# Patient Record
Sex: MALE | Race: WHITE | NOT HISPANIC OR LATINO | Employment: FULL TIME | URBAN - METROPOLITAN AREA
[De-identification: names, ages, dates, MRNs, and addresses within clinical notes are randomized per-mention and may not be internally consistent; named-entity substitution may affect disease eponyms.]

---

## 2023-10-06 DIAGNOSIS — I10 ESSENTIAL (PRIMARY) HYPERTENSION: ICD-10-CM

## 2023-10-06 RX ORDER — LISINOPRIL 5 MG/1
5 TABLET ORAL DAILY
Qty: 90 TABLET | Refills: 1 | Status: SHIPPED | OUTPATIENT
Start: 2023-10-06 | End: 2024-04-19

## 2023-12-14 ENCOUNTER — APPOINTMENT (OUTPATIENT)
Dept: PRIMARY CARE | Facility: CLINIC | Age: 68
End: 2023-12-14
Payer: MEDICARE

## 2023-12-15 PROBLEM — L82.1 OTHER SEBORRHEIC KERATOSIS: Status: ACTIVE | Noted: 2022-05-31

## 2023-12-15 PROBLEM — R06.89 DYSPNEA AND RESPIRATORY ABNORMALITIES: Status: ACTIVE | Noted: 2023-12-15

## 2023-12-15 PROBLEM — L57.8 OTHER SKIN CHANGES DUE TO CHRONIC EXPOSURE TO NONIONIZING RADIATION: Status: ACTIVE | Noted: 2022-05-31

## 2023-12-15 PROBLEM — I20.89 OTHER FORMS OF ANGINA PECTORIS (CMS-HCC): Status: ACTIVE | Noted: 2023-12-15

## 2023-12-15 PROBLEM — B02.29 POSTHERPETIC NEURALGIA: Status: ACTIVE | Noted: 2023-12-15

## 2023-12-15 PROBLEM — D18.01 HEMANGIOMA OF SKIN AND SUBCUTANEOUS TISSUE: Status: ACTIVE | Noted: 2022-05-31

## 2023-12-15 PROBLEM — K42.9 UMBILICAL HERNIA: Status: ACTIVE | Noted: 2023-12-15

## 2023-12-15 PROBLEM — E78.5 HYPERLIPIDEMIA: Status: ACTIVE | Noted: 2023-12-15

## 2023-12-15 PROBLEM — R93.1 ELEVATED CORONARY ARTERY CALCIUM SCORE: Status: ACTIVE | Noted: 2023-12-15

## 2023-12-15 PROBLEM — I10 ESSENTIAL HYPERTENSION, BENIGN: Status: ACTIVE | Noted: 2023-12-15

## 2023-12-15 PROBLEM — R73.01 ELEVATED FASTING GLUCOSE: Status: ACTIVE | Noted: 2023-12-15

## 2023-12-15 PROBLEM — R94.39 ABNORMAL STRESS TEST: Status: ACTIVE | Noted: 2023-12-15

## 2023-12-15 PROBLEM — J98.4 CALCIFIED GRANULOMA OF LUNG: Status: ACTIVE | Noted: 2023-12-15

## 2023-12-15 PROBLEM — J84.10 CALCIFIED GRANULOMA OF LUNG (MULTI): Status: ACTIVE | Noted: 2023-12-15

## 2023-12-15 PROBLEM — J62.8: Status: ACTIVE | Noted: 2023-12-15

## 2023-12-15 PROBLEM — M67.471 DIGITAL MUCINOUS CYST OF TOE OF RIGHT FOOT: Status: ACTIVE | Noted: 2023-12-15

## 2023-12-15 PROBLEM — L81.9 PIGMENTED SKIN LESION: Status: ACTIVE | Noted: 2023-12-15

## 2023-12-15 PROBLEM — R06.00 DYSPNEA AND RESPIRATORY ABNORMALITIES: Status: ACTIVE | Noted: 2023-12-15

## 2023-12-15 PROBLEM — M79.675 PAIN OF LEFT GREAT TOE: Status: ACTIVE | Noted: 2023-12-15

## 2023-12-15 PROBLEM — L57.0 ACTINIC KERATOSIS: Status: ACTIVE | Noted: 2022-05-31

## 2023-12-15 RX ORDER — ROSUVASTATIN CALCIUM 20 MG/1
1 TABLET, COATED ORAL DAILY
COMMUNITY
End: 2024-02-14

## 2023-12-15 RX ORDER — LEVOTHYROXINE SODIUM 50 UG/1
1 TABLET ORAL
COMMUNITY
Start: 2023-08-11

## 2023-12-22 ENCOUNTER — LAB (OUTPATIENT)
Dept: LAB | Facility: LAB | Age: 68
End: 2023-12-22
Payer: MEDICARE

## 2023-12-22 DIAGNOSIS — E78.5 HYPERLIPIDEMIA, UNSPECIFIED: ICD-10-CM

## 2023-12-22 DIAGNOSIS — Z86.2 PERSONAL HISTORY OF DISEASES OF THE BLOOD AND BLOOD-FORMING ORGANS AND CERTAIN DISORDERS INVOLVING THE IMMUNE MECHANISM: ICD-10-CM

## 2023-12-22 DIAGNOSIS — R73.01 IMPAIRED FASTING GLUCOSE: Primary | ICD-10-CM

## 2023-12-22 LAB
ALBUMIN SERPL BCP-MCNC: 4.4 G/DL (ref 3.4–5)
ALP SERPL-CCNC: 80 U/L (ref 33–136)
ALT SERPL W P-5'-P-CCNC: 23 U/L (ref 10–52)
ANION GAP SERPL CALC-SCNC: 10 MMOL/L (ref 10–20)
AST SERPL W P-5'-P-CCNC: 16 U/L (ref 9–39)
BASOPHILS # BLD AUTO: 0.03 X10*3/UL (ref 0–0.1)
BASOPHILS NFR BLD AUTO: 0.5 %
BILIRUB SERPL-MCNC: 0.6 MG/DL (ref 0–1.2)
BUN SERPL-MCNC: 19 MG/DL (ref 6–23)
CALCIUM SERPL-MCNC: 9 MG/DL (ref 8.6–10.3)
CHLORIDE SERPL-SCNC: 103 MMOL/L (ref 98–107)
CHOLEST SERPL-MCNC: 137 MG/DL (ref 0–199)
CHOLESTEROL/HDL RATIO: 3
CO2 SERPL-SCNC: 29 MMOL/L (ref 21–32)
CREAT SERPL-MCNC: 0.76 MG/DL (ref 0.5–1.3)
EOSINOPHIL # BLD AUTO: 0.16 X10*3/UL (ref 0–0.7)
EOSINOPHIL NFR BLD AUTO: 2.9 %
ERYTHROCYTE [DISTWIDTH] IN BLOOD BY AUTOMATED COUNT: 12.5 % (ref 11.5–14.5)
EST. AVERAGE GLUCOSE BLD GHB EST-MCNC: 143 MG/DL
GFR SERPL CREATININE-BSD FRML MDRD: >90 ML/MIN/1.73M*2
GLUCOSE SERPL-MCNC: 155 MG/DL (ref 74–99)
HBA1C MFR BLD: 6.6 %
HCT VFR BLD AUTO: 45 % (ref 41–52)
HDLC SERPL-MCNC: 45 MG/DL
HGB BLD-MCNC: 15.1 G/DL (ref 13.5–17.5)
IMM GRANULOCYTES # BLD AUTO: 0.02 X10*3/UL (ref 0–0.7)
IMM GRANULOCYTES NFR BLD AUTO: 0.4 % (ref 0–0.9)
LDLC SERPL CALC-MCNC: 70 MG/DL
LYMPHOCYTES # BLD AUTO: 0.76 X10*3/UL (ref 1.2–4.8)
LYMPHOCYTES NFR BLD AUTO: 13.9 %
MCH RBC QN AUTO: 28.1 PG (ref 26–34)
MCHC RBC AUTO-ENTMCNC: 33.6 G/DL (ref 32–36)
MCV RBC AUTO: 84 FL (ref 80–100)
MONOCYTES # BLD AUTO: 0.77 X10*3/UL (ref 0.1–1)
MONOCYTES NFR BLD AUTO: 14.1 %
NEUTROPHILS # BLD AUTO: 3.72 X10*3/UL (ref 1.2–7.7)
NEUTROPHILS NFR BLD AUTO: 68.2 %
NON HDL CHOLESTEROL: 92 MG/DL (ref 0–149)
NRBC BLD-RTO: 0 /100 WBCS (ref 0–0)
PLATELET # BLD AUTO: 138 X10*3/UL (ref 150–450)
POTASSIUM SERPL-SCNC: 4.2 MMOL/L (ref 3.5–5.3)
PROT SERPL-MCNC: 6.8 G/DL (ref 6.4–8.2)
RBC # BLD AUTO: 5.38 X10*6/UL (ref 4.5–5.9)
SODIUM SERPL-SCNC: 138 MMOL/L (ref 136–145)
TRIGL SERPL-MCNC: 109 MG/DL (ref 0–149)
TSH SERPL-ACNC: 3.26 MIU/L (ref 0.44–3.98)
VLDL: 22 MG/DL (ref 0–40)
WBC # BLD AUTO: 5.5 X10*3/UL (ref 4.4–11.3)

## 2023-12-22 PROCEDURE — 80061 LIPID PANEL: CPT

## 2023-12-22 PROCEDURE — 83036 HEMOGLOBIN GLYCOSYLATED A1C: CPT

## 2023-12-22 PROCEDURE — 36415 COLL VENOUS BLD VENIPUNCTURE: CPT

## 2023-12-22 PROCEDURE — 84443 ASSAY THYROID STIM HORMONE: CPT

## 2023-12-22 PROCEDURE — 80053 COMPREHEN METABOLIC PANEL: CPT

## 2023-12-22 PROCEDURE — 85025 COMPLETE CBC W/AUTO DIFF WBC: CPT

## 2023-12-26 ENCOUNTER — TELEPHONE (OUTPATIENT)
Dept: PRIMARY CARE | Facility: CLINIC | Age: 68
End: 2023-12-26
Payer: MEDICARE

## 2023-12-26 NOTE — TELEPHONE ENCOUNTER
----- Message from Avery Ozuna MD sent at 12/26/2023  2:12 PM EST -----  Labs acceptable but there are a few abnormalities similar to previous labs.  Please advise patient to continue the same medications and follow-up to review in detail and discuss the management options.

## 2024-01-02 ENCOUNTER — OFFICE VISIT (OUTPATIENT)
Dept: PRIMARY CARE | Facility: CLINIC | Age: 69
End: 2024-01-02
Payer: MEDICARE

## 2024-01-02 VITALS
WEIGHT: 208 LBS | DIASTOLIC BLOOD PRESSURE: 78 MMHG | SYSTOLIC BLOOD PRESSURE: 166 MMHG | BODY MASS INDEX: 31.52 KG/M2 | HEIGHT: 68 IN | HEART RATE: 69 BPM

## 2024-01-02 DIAGNOSIS — D69.6 THROMBOCYTOPENIA, UNSPECIFIED (CMS-HCC): ICD-10-CM

## 2024-01-02 DIAGNOSIS — K42.9 UMBILICAL HERNIA WITHOUT OBSTRUCTION AND WITHOUT GANGRENE: ICD-10-CM

## 2024-01-02 DIAGNOSIS — J62.8: ICD-10-CM

## 2024-01-02 DIAGNOSIS — E11.9 DIABETES MELLITUS WITHOUT COMPLICATION (MULTI): Primary | ICD-10-CM

## 2024-01-02 DIAGNOSIS — I10 ESSENTIAL HYPERTENSION, BENIGN: ICD-10-CM

## 2024-01-02 DIAGNOSIS — R93.1 ELEVATED CORONARY ARTERY CALCIUM SCORE: ICD-10-CM

## 2024-01-02 DIAGNOSIS — E78.2 MIXED HYPERLIPIDEMIA: ICD-10-CM

## 2024-01-02 DIAGNOSIS — E66.9 OBESITY (BMI 30.0-34.9): ICD-10-CM

## 2024-01-02 DIAGNOSIS — Z12.5 PROSTATE CANCER SCREENING: ICD-10-CM

## 2024-01-02 PROCEDURE — 1159F MED LIST DOCD IN RCRD: CPT | Performed by: INTERNAL MEDICINE

## 2024-01-02 PROCEDURE — 4010F ACE/ARB THERAPY RXD/TAKEN: CPT | Performed by: INTERNAL MEDICINE

## 2024-01-02 PROCEDURE — 90662 IIV NO PRSV INCREASED AG IM: CPT | Performed by: INTERNAL MEDICINE

## 2024-01-02 PROCEDURE — 3077F SYST BP >= 140 MM HG: CPT | Performed by: INTERNAL MEDICINE

## 2024-01-02 PROCEDURE — 3008F BODY MASS INDEX DOCD: CPT | Performed by: INTERNAL MEDICINE

## 2024-01-02 PROCEDURE — 3078F DIAST BP <80 MM HG: CPT | Performed by: INTERNAL MEDICINE

## 2024-01-02 PROCEDURE — 99213 OFFICE O/P EST LOW 20 MIN: CPT | Performed by: INTERNAL MEDICINE

## 2024-01-02 PROCEDURE — G0008 ADMIN INFLUENZA VIRUS VAC: HCPCS | Performed by: INTERNAL MEDICINE

## 2024-01-02 PROCEDURE — 4004F PT TOBACCO SCREEN RCVD TLK: CPT | Performed by: INTERNAL MEDICINE

## 2024-01-02 PROCEDURE — 1160F RVW MEDS BY RX/DR IN RCRD: CPT | Performed by: INTERNAL MEDICINE

## 2024-01-02 RX ORDER — ASPIRIN 81 MG/1
1 TABLET ORAL DAILY
COMMUNITY

## 2024-01-02 NOTE — PROGRESS NOTES
Assessment/Plan   68 years old male with chronic medical illnesses came for a follow-up visit.  Overall he is doing well.  He has slightly elevated blood pressure and also he has not lost weight.  His lab tests were reviewed and discussed.  His hemoglobin A1c is 6.6.  I advised him to start metformin and we discussed the side effects and the benefits of the metformin.  Patient at this time does not want to do the metformin and he will do the lifestyle modification and he says he will go down his weight to at least 190 within next 3 months.  Will follow him up with the blood test in 3 months time.  Patient will continue to monitor his blood pressure at home.    Patient preventive care were reviewed and discussed.  He will have the flu vaccine today.  He will have a PSA prior to his next visit.  Patient has a Cologuard at home and he will do the Cologuard.      Problem List Items Addressed This Visit       Chronic silicosis (CMS/HCC)    Elevated coronary artery calcium score    Relevant Orders    Comprehensive Metabolic Panel    Essential hypertension, benign    Relevant Orders    CBC and Auto Differential    Hyperlipidemia    Relevant Orders    Comprehensive Metabolic Panel    Umbilical hernia    Thrombocytopenia, unspecified (CMS/HCC)    Relevant Medications    aspirin 81 mg EC tablet    Diabetes mellitus without complication (CMS/HCC) - Primary    Relevant Orders    Hemoglobin A1C     Other Visit Diagnoses       Obesity (BMI 30.0-34.9)        Prostate cancer screening        Relevant Orders    Prostate Specific Antigen, Screen            Subjective     Patient ID: Keith Moreno Jr. is a 68 y.o. male who presents for Follow-up and Fatigue (Really easy).    History of present illness  Patient came for a follow-up visit.  He has had a blood test done.  Overall he is doing well.  During the holiday months he admits that he has not been watching the diet as he should be.  He has had some extra salt within the last 2 to  3 weeks.    He denies any chest pain no short of breath.  No urine bowel changes.  ROS  Denies any significant complaints on review of system.  No family history on file.   Social History     Socioeconomic History    Marital status:      Spouse name: Not on file    Number of children: Not on file    Years of education: Not on file    Highest education level: Not on file   Occupational History    Not on file   Tobacco Use    Smoking status: Some Days     Types: Cigars    Smokeless tobacco: Never   Vaping Use    Vaping Use: Never used   Substance and Sexual Activity    Alcohol use: Not Currently     Alcohol/week: 5.0 standard drinks of alcohol     Types: 3 Cans of beer, 2 Shots of liquor per week    Drug use: Never    Sexual activity: Not on file   Other Topics Concern    Not on file   Social History Narrative    Not on file     Social Determinants of Health     Financial Resource Strain: Not on file   Food Insecurity: Not on file   Transportation Needs: Not on file   Physical Activity: Not on file   Stress: Not on file   Social Connections: Not on file   Intimate Partner Violence: Not on file   Housing Stability: Not on file      Bee venom protein (honey bee)   Current Outpatient Medications   Medication Sig Dispense Refill    aspirin 81 mg EC tablet Take 1 tablet (81 mg) by mouth once daily.      levothyroxine (Synthroid, Levoxyl) 50 mcg tablet Take 1 tablet (50 mcg) by mouth once daily in the morning. Take before meals.      lisinopril 5 mg tablet TAKE 1 TABLET BY MOUTH EVERY DAY 90 tablet 1    rosuvastatin (Crestor) 20 mg tablet Take 1 tablet (20 mg) by mouth once daily.       No current facility-administered medications for this visit.       Objective     Vitals:    01/02/24 1556   BP: 166/78   Pulse: 69        Physical Exam  Normal-built, well-nourished with no apparent distress. Alert oriented  Skin: Normal turgor. No rash,  Head: Normocephalic, atraumatic.  Eyes: Pupils are equal, round,.  No pallor of  conjunctivae. Mucous membranes are moist.  Neck: Supple. No JVD. No carotid bruit. No thyromegaly. No cervical lymphadenopathy.  No clubbing  Chest: Vesicular breathing Bilaterally good air entry. No wheezing. No crepitus  Heart: Regular rate and rhythm. S1, S2 positive. No murmur.  Abdomen: Soft and nontender. Bowel sounds are positive. No organomegaly. Umbilical hernia noted easily reducible.  Extremities: Bilaterally no pedal pitting edema. Bilaterally 2+ dorsalis pedis pulses.  No calf tenderness. Homans sign is negative.  Neuro Exam: No focal signs. Gait is normal.       Problem List Items Addressed This Visit       Chronic silicosis (CMS/HCC)    Elevated coronary artery calcium score    Relevant Orders    Comprehensive Metabolic Panel    Essential hypertension, benign    Relevant Orders    CBC and Auto Differential    Hyperlipidemia    Relevant Orders    Comprehensive Metabolic Panel    Umbilical hernia    Thrombocytopenia, unspecified (CMS/HCC)    Relevant Medications    aspirin 81 mg EC tablet    Diabetes mellitus without complication (CMS/HCC) - Primary    Relevant Orders    Hemoglobin A1C     Other Visit Diagnoses       Obesity (BMI 30.0-34.9)        Prostate cancer screening        Relevant Orders    Prostate Specific Antigen, Screen             Orders Placed This Encounter   Procedures    Flu vaccine (IIV4) age 6 months and greater, preservative free    Comprehensive Metabolic Panel     Standing Status:   Future     Standing Expiration Date:   1/2/2025     Order Specific Question:   Release result to [facility]     Answer:   Immediate    CBC and Auto Differential     Standing Status:   Future     Standing Expiration Date:   1/2/2025     Order Specific Question:   Release result to [employer]     Answer:   Immediate    Hemoglobin A1C     Standing Status:   Future     Standing Expiration Date:   1/2/2025     Order Specific Question:   Release result to [employer]     Answer:   Immediate    Prostate Specific  Antigen, Screen     Standing Status:   Future     Standing Expiration Date:   1/2/2025     Order Specific Question:   Release result to [facility]     Answer:   Immediate [1]        Lab Results   Component Value Date    WBC 5.5 12/22/2023    HGB 15.1 12/22/2023    HCT 45.0 12/22/2023     (L) 12/22/2023    CHOL 137 12/22/2023    TRIG 109 12/22/2023    HDL 45.0 12/22/2023    ALT 23 12/22/2023    AST 16 12/22/2023     12/22/2023    K 4.2 12/22/2023     12/22/2023    CREATININE 0.76 12/22/2023    BUN 19 12/22/2023    CO2 29 12/22/2023    TSH 3.26 12/22/2023    PSA 0.6 11/23/2021    HGBA1C 6.6 (H) 12/22/2023     Lab Results   Component Value Date    CHOL 137 12/22/2023    LDLCALC 70 12/22/2023    CHHDL 3.0 12/22/2023       No results found.

## 2024-02-14 DIAGNOSIS — E78.5 HYPERLIPIDEMIA, UNSPECIFIED: ICD-10-CM

## 2024-02-14 RX ORDER — ROSUVASTATIN CALCIUM 20 MG/1
20 TABLET, COATED ORAL DAILY
Qty: 90 TABLET | Refills: 3 | Status: SHIPPED | OUTPATIENT
Start: 2024-02-14

## 2024-04-02 ENCOUNTER — APPOINTMENT (OUTPATIENT)
Dept: PRIMARY CARE | Facility: CLINIC | Age: 69
End: 2024-04-02
Payer: MEDICARE

## 2024-04-08 ENCOUNTER — APPOINTMENT (OUTPATIENT)
Dept: PRIMARY CARE | Facility: CLINIC | Age: 69
End: 2024-04-08
Payer: MEDICARE

## 2024-04-17 ENCOUNTER — OFFICE VISIT (OUTPATIENT)
Dept: PRIMARY CARE | Facility: CLINIC | Age: 69
End: 2024-04-17
Payer: MEDICARE

## 2024-04-17 ENCOUNTER — LAB (OUTPATIENT)
Dept: LAB | Facility: LAB | Age: 69
End: 2024-04-17
Payer: MEDICARE

## 2024-04-17 VITALS
HEART RATE: 74 BPM | SYSTOLIC BLOOD PRESSURE: 132 MMHG | WEIGHT: 202 LBS | HEIGHT: 68 IN | DIASTOLIC BLOOD PRESSURE: 76 MMHG | BODY MASS INDEX: 30.62 KG/M2

## 2024-04-17 DIAGNOSIS — E78.2 MIXED HYPERLIPIDEMIA: ICD-10-CM

## 2024-04-17 DIAGNOSIS — E11.9 DIABETES MELLITUS WITHOUT COMPLICATION (MULTI): ICD-10-CM

## 2024-04-17 DIAGNOSIS — R73.01 ELEVATED FASTING GLUCOSE: ICD-10-CM

## 2024-04-17 DIAGNOSIS — I10 ESSENTIAL HYPERTENSION, BENIGN: ICD-10-CM

## 2024-04-17 DIAGNOSIS — J84.10 CALCIFIED GRANULOMA OF LUNG (MULTI): ICD-10-CM

## 2024-04-17 DIAGNOSIS — R93.1 ELEVATED CORONARY ARTERY CALCIUM SCORE: Primary | ICD-10-CM

## 2024-04-17 DIAGNOSIS — K42.9 UMBILICAL HERNIA WITHOUT OBSTRUCTION AND WITHOUT GANGRENE: ICD-10-CM

## 2024-04-17 DIAGNOSIS — R93.1 ELEVATED CORONARY ARTERY CALCIUM SCORE: ICD-10-CM

## 2024-04-17 DIAGNOSIS — Z12.5 PROSTATE CANCER SCREENING: ICD-10-CM

## 2024-04-17 PROBLEM — I20.89 OTHER FORMS OF ANGINA PECTORIS (CMS-HCC): Status: RESOLVED | Noted: 2023-12-15 | Resolved: 2024-04-17

## 2024-04-17 LAB
ALBUMIN SERPL BCP-MCNC: 4.5 G/DL (ref 3.4–5)
ALP SERPL-CCNC: 89 U/L (ref 33–136)
ALT SERPL W P-5'-P-CCNC: 24 U/L (ref 10–52)
ANION GAP SERPL CALC-SCNC: 11 MMOL/L (ref 10–20)
AST SERPL W P-5'-P-CCNC: 17 U/L (ref 9–39)
BASOPHILS # BLD AUTO: 0.04 X10*3/UL (ref 0–0.1)
BASOPHILS NFR BLD AUTO: 0.8 %
BILIRUB SERPL-MCNC: 0.7 MG/DL (ref 0–1.2)
BUN SERPL-MCNC: 19 MG/DL (ref 6–23)
CALCIUM SERPL-MCNC: 8.9 MG/DL (ref 8.6–10.3)
CHLORIDE SERPL-SCNC: 104 MMOL/L (ref 98–107)
CO2 SERPL-SCNC: 26 MMOL/L (ref 21–32)
CREAT SERPL-MCNC: 0.75 MG/DL (ref 0.5–1.3)
EGFRCR SERPLBLD CKD-EPI 2021: >90 ML/MIN/1.73M*2
EOSINOPHIL # BLD AUTO: 0.18 X10*3/UL (ref 0–0.7)
EOSINOPHIL NFR BLD AUTO: 3.4 %
ERYTHROCYTE [DISTWIDTH] IN BLOOD BY AUTOMATED COUNT: 12.3 % (ref 11.5–14.5)
EST. AVERAGE GLUCOSE BLD GHB EST-MCNC: 151 MG/DL
GLUCOSE SERPL-MCNC: 141 MG/DL (ref 74–99)
HBA1C MFR BLD: 6.9 %
HCT VFR BLD AUTO: 44.6 % (ref 41–52)
HGB BLD-MCNC: 15 G/DL (ref 13.5–17.5)
IMM GRANULOCYTES # BLD AUTO: 0.01 X10*3/UL (ref 0–0.7)
IMM GRANULOCYTES NFR BLD AUTO: 0.2 % (ref 0–0.9)
LYMPHOCYTES # BLD AUTO: 0.94 X10*3/UL (ref 1.2–4.8)
LYMPHOCYTES NFR BLD AUTO: 17.7 %
MCH RBC QN AUTO: 28.2 PG (ref 26–34)
MCHC RBC AUTO-ENTMCNC: 33.6 G/DL (ref 32–36)
MCV RBC AUTO: 84 FL (ref 80–100)
MONOCYTES # BLD AUTO: 0.72 X10*3/UL (ref 0.1–1)
MONOCYTES NFR BLD AUTO: 13.6 %
NEUTROPHILS # BLD AUTO: 3.42 X10*3/UL (ref 1.2–7.7)
NEUTROPHILS NFR BLD AUTO: 64.3 %
NRBC BLD-RTO: 0 /100 WBCS (ref 0–0)
PLATELET # BLD AUTO: 138 X10*3/UL (ref 150–450)
POTASSIUM SERPL-SCNC: 4.2 MMOL/L (ref 3.5–5.3)
PROT SERPL-MCNC: 7 G/DL (ref 6.4–8.2)
PSA SERPL-MCNC: 0.4 NG/ML
RBC # BLD AUTO: 5.32 X10*6/UL (ref 4.5–5.9)
SODIUM SERPL-SCNC: 137 MMOL/L (ref 136–145)
WBC # BLD AUTO: 5.3 X10*3/UL (ref 4.4–11.3)

## 2024-04-17 PROCEDURE — 80053 COMPREHEN METABOLIC PANEL: CPT

## 2024-04-17 PROCEDURE — 1036F TOBACCO NON-USER: CPT | Performed by: INTERNAL MEDICINE

## 2024-04-17 PROCEDURE — G0103 PSA SCREENING: HCPCS

## 2024-04-17 PROCEDURE — 36415 COLL VENOUS BLD VENIPUNCTURE: CPT

## 2024-04-17 PROCEDURE — 85025 COMPLETE CBC W/AUTO DIFF WBC: CPT

## 2024-04-17 PROCEDURE — 1159F MED LIST DOCD IN RCRD: CPT | Performed by: INTERNAL MEDICINE

## 2024-04-17 PROCEDURE — 1160F RVW MEDS BY RX/DR IN RCRD: CPT | Performed by: INTERNAL MEDICINE

## 2024-04-17 PROCEDURE — 4010F ACE/ARB THERAPY RXD/TAKEN: CPT | Performed by: INTERNAL MEDICINE

## 2024-04-17 PROCEDURE — 99214 OFFICE O/P EST MOD 30 MIN: CPT | Performed by: INTERNAL MEDICINE

## 2024-04-17 PROCEDURE — 83036 HEMOGLOBIN GLYCOSYLATED A1C: CPT

## 2024-04-17 PROCEDURE — 3078F DIAST BP <80 MM HG: CPT | Performed by: INTERNAL MEDICINE

## 2024-04-17 PROCEDURE — 3075F SYST BP GE 130 - 139MM HG: CPT | Performed by: INTERNAL MEDICINE

## 2024-04-17 ASSESSMENT — LIFESTYLE VARIABLES
SKIP TO QUESTIONS 9-10: 1
AUDIT TOTAL SCORE: 0
AUDIT-C TOTAL SCORE: 0
HOW MANY STANDARD DRINKS CONTAINING ALCOHOL DO YOU HAVE ON A TYPICAL DAY: PATIENT DOES NOT DRINK
HOW OFTEN DO YOU HAVE SIX OR MORE DRINKS ON ONE OCCASION: NEVER
HAS A RELATIVE, FRIEND, DOCTOR, OR ANOTHER HEALTH PROFESSIONAL EXPRESSED CONCERN ABOUT YOUR DRINKING OR SUGGESTED YOU CUT DOWN: NO
HOW OFTEN DO YOU HAVE A DRINK CONTAINING ALCOHOL: NEVER
HAVE YOU OR SOMEONE ELSE BEEN INJURED AS A RESULT OF YOUR DRINKING: NO

## 2024-04-17 ASSESSMENT — PATIENT HEALTH QUESTIONNAIRE - PHQ9
SUM OF ALL RESPONSES TO PHQ9 QUESTIONS 1 AND 2: 0
1. LITTLE INTEREST OR PLEASURE IN DOING THINGS: NOT AT ALL
2. FEELING DOWN, DEPRESSED OR HOPELESS: NOT AT ALL

## 2024-04-17 NOTE — PROGRESS NOTES
Assessment/Plan   69 years old male with chronic medical problems including has had elevated coronary artery calcium score for which he has been seeing a cardiologist has had previous heart cath without significant obstructive lesions is on lifestyle modification and risk factor modifications to prevent any cardiac events.    We reviewed about his blood pressure which is well-controlled at this time.  He will continue the rosuvastatin for his hyperlipidemia and his cholesterol is well-controlled.    Patient was supposed to do repeat hemoglobin A1c to decide about starting the metformin if its appropriate.  He did not do the blood test and he will do it within next 2 days.  Depending on the results we will decide about any intervention if its appropriate but patient will continue the lifestyle modifications.    Patient has umbilical hernia and we discussed about the management options and he will see a surgeon.  He understands he may not be able to do heavy lifting after the surgery depending on the intervention at least for 6 weeks.    If his blood test acceptable patient will follow-up with me in 6 months time with lab test as ordered.  He will keep the appointment with the cardiologist as scheduled.  Preventive care were reviewed and discussed.          Problem List Items Addressed This Visit       Calcified granuloma of lung (Multi)    Elevated coronary artery calcium score - Primary    Elevated fasting glucose    Essential hypertension, benign    Hyperlipidemia    Umbilical hernia    Relevant Orders    Referral to General Surgery    Diabetes mellitus without complication (Multi)       Subjective     Patient ID: Keith Moreno . is a 69 y.o. male who presents for Follow-up.    History of present illness  69 years old male who is physically active works as a  and does remodeling's of the buildings came for a follow-up visit.  He has not had a blood test done even though he was advised to do  prior to this visit.    He is watching his diet but admits that he has hard time losing weight.    He is compliant with taking his medications and is tolerating his blood pressure medicine well.    He has no chest pain or short of breath.  No urine bowel changes.    REVIEW OF SYSTEMS:  General:  Denies significant weight changes, fever, chills or weakness.  SKIN: Denies any rash or change in moles.  HEENT:  No vision or hearing changes. No headache. No vertigo, No tinnitus.   CVS. No chest pain, no palpitation, no short of breath on mild-to-moderate exertion.  Respiratory:  No cough or shortness of breath at rest.  GI:  No loss of appetite. No change in bowel habit. No abdominal pain. No blood in stool.  GUR: No dysuria. No hematuria, No fever. No incontinence.  CNS:  No memory or mood changes. No gait disturbance. No focal weakness. No tremors. No tingling or numbness of extremities.   ENDO: No cold intolerance. No fatigue.  All other systems have been  reviewed and are negative for complaints.      Family History   Problem Relation Name Age of Onset    Alzheimer's disease Mother      Other (diabetes mellitus) Mother      Other (malignant neoplasm of colon) Father      Other (myocardial infarction) Father      Other (malignant neoplasm of skin) Sister      Irritable bowel syndrome Daughter        Social History     Socioeconomic History    Marital status:      Spouse name: Not on file    Number of children: Not on file    Years of education: Not on file    Highest education level: Not on file   Occupational History    Not on file   Tobacco Use    Smoking status: Former     Types: Cigars     Start date: 3/10/1975     Quit date: 3/10/2022     Years since quittin.1    Smokeless tobacco: Never   Vaping Use    Vaping status: Never Used   Substance and Sexual Activity    Alcohol use: Not Currently     Alcohol/week: 5.0 standard drinks of alcohol     Types: 3 Cans of beer, 2 Shots of liquor per week    Drug  use: Never    Sexual activity: Not on file   Other Topics Concern    Not on file   Social History Narrative    Not on file     Social Determinants of Health     Financial Resource Strain: Not on file   Food Insecurity: Not on file   Transportation Needs: Not on file   Physical Activity: Not on file   Stress: Not on file   Social Connections: Not on file   Intimate Partner Violence: Not on file   Housing Stability: Not on file      Bee venom protein (honey bee)   Current Outpatient Medications   Medication Sig Dispense Refill    aspirin 81 mg EC tablet Take 1 tablet (81 mg) by mouth once daily.      levothyroxine (Synthroid, Levoxyl) 50 mcg tablet Take 1 tablet (50 mcg) by mouth once daily in the morning. Take before meals.      lisinopril 5 mg tablet TAKE 1 TABLET BY MOUTH EVERY DAY 90 tablet 1    rosuvastatin (Crestor) 20 mg tablet TAKE 1 TABLET BY MOUTH EVERY DAY 90 tablet 3     No current facility-administered medications for this visit.       Objective     Vitals:    04/17/24 0854   BP: 132/76   Pulse: 74        Physical Exam   Normal-built, well-nourished  with no apparent distress. Alert oriented  Skin:  Normal turgor.  No rash.  Head:  Normocephalic, atraumatic.  Eyes:  Pupils are equal, round,.  No pallor of conjunctivae.  Mouth has moist oral mucosa.  Neck:  Supple.  No JVD.  No carotid bruit.  No thyromegaly. No cervical lymphadenopathy.  No clubbing  Chest:  Vesicular breathing Bilaterally good air entry and bilaterally clear to auscultation.  No wheezing.  No crackles.  Heart:  Regular rate and rhythm.  S1, S2 positive.  No murmur.  Abdomen:  Soft and nontender.  Has umbilical hernia noted.  Easily reducible and has no tenderness.  Bowel sounds are positive.  No organomegaly.  Extremities:  Bilaterally no pedal pitting edema.  Bilaterally 2+ dorsalis pedis pulses.  No calf tenderness. Homans sign is negative.  Neuro Exam: No focal signs. Gait is normal.        Problem List Items Addressed This Visit        Calcified granuloma of lung (Multi)    Elevated coronary artery calcium score - Primary    Elevated fasting glucose    Essential hypertension, benign    Hyperlipidemia    Umbilical hernia    Relevant Orders    Referral to General Surgery    Diabetes mellitus without complication (Multi)        Orders Placed This Encounter   Procedures    Referral to General Surgery     Standing Status:   Future     Standing Expiration Date:   10/17/2024     Referral Priority:   Routine     Referral Type:   Consultation     Referral Reason:   Specialty Services Required     Number of Visits Requested:   1        Lab Results   Component Value Date    WBC 5.5 12/22/2023    HGB 15.1 12/22/2023    HCT 45.0 12/22/2023     (L) 12/22/2023    CHOL 137 12/22/2023    TRIG 109 12/22/2023    HDL 45.0 12/22/2023    ALT 23 12/22/2023    AST 16 12/22/2023     12/22/2023    K 4.2 12/22/2023     12/22/2023    CREATININE 0.76 12/22/2023    BUN 19 12/22/2023    CO2 29 12/22/2023    TSH 3.26 12/22/2023    PSA 0.6 11/23/2021    HGBA1C 6.6 (H) 12/22/2023     Lab Results   Component Value Date    CHOL 137 12/22/2023    LDLCALC 70 12/22/2023    CHHDL 3.0 12/22/2023       No results found.

## 2024-04-18 NOTE — RESULT ENCOUNTER NOTE
Labs acceptable but there are a few abnormalities similar to previous labs.  Please advise patient to continue the same medications and follow-up to review in detail and discuss the management options as already scheduled appointment.

## 2024-04-19 DIAGNOSIS — I10 ESSENTIAL (PRIMARY) HYPERTENSION: ICD-10-CM

## 2024-04-19 RX ORDER — LISINOPRIL 5 MG/1
5 TABLET ORAL DAILY
Qty: 90 TABLET | Refills: 3 | Status: SHIPPED | OUTPATIENT
Start: 2024-04-19

## 2024-04-19 NOTE — TELEPHONE ENCOUNTER
----- Message from Avery Ozuna MD sent at 4/18/2024 11:15 AM EDT -----  Labs acceptable but there are a few abnormalities similar to previous labs.  Please advise patient to continue the same medications and follow-up to review in detail and discuss the management options as already scheduled appointment.

## 2024-04-29 ENCOUNTER — APPOINTMENT (OUTPATIENT)
Dept: CARDIOLOGY | Facility: CLINIC | Age: 69
End: 2024-04-29
Payer: MEDICARE

## 2024-04-29 ENCOUNTER — OFFICE VISIT (OUTPATIENT)
Dept: CARDIOLOGY | Facility: CLINIC | Age: 69
End: 2024-04-29
Payer: MEDICARE

## 2024-04-29 VITALS
SYSTOLIC BLOOD PRESSURE: 142 MMHG | HEART RATE: 78 BPM | DIASTOLIC BLOOD PRESSURE: 80 MMHG | HEIGHT: 68 IN | WEIGHT: 202 LBS | BODY MASS INDEX: 30.62 KG/M2

## 2024-04-29 DIAGNOSIS — E78.2 MIXED HYPERLIPIDEMIA: ICD-10-CM

## 2024-04-29 DIAGNOSIS — I10 ESSENTIAL HYPERTENSION, BENIGN: ICD-10-CM

## 2024-04-29 PROCEDURE — 1159F MED LIST DOCD IN RCRD: CPT | Performed by: INTERNAL MEDICINE

## 2024-04-29 PROCEDURE — 3077F SYST BP >= 140 MM HG: CPT | Performed by: INTERNAL MEDICINE

## 2024-04-29 PROCEDURE — 3008F BODY MASS INDEX DOCD: CPT | Performed by: INTERNAL MEDICINE

## 2024-04-29 PROCEDURE — 3079F DIAST BP 80-89 MM HG: CPT | Performed by: INTERNAL MEDICINE

## 2024-04-29 PROCEDURE — 3044F HG A1C LEVEL LT 7.0%: CPT | Performed by: INTERNAL MEDICINE

## 2024-04-29 PROCEDURE — 99213 OFFICE O/P EST LOW 20 MIN: CPT | Performed by: INTERNAL MEDICINE

## 2024-04-29 PROCEDURE — 1160F RVW MEDS BY RX/DR IN RCRD: CPT | Performed by: INTERNAL MEDICINE

## 2024-04-29 PROCEDURE — 4010F ACE/ARB THERAPY RXD/TAKEN: CPT | Performed by: INTERNAL MEDICINE

## 2024-04-29 PROCEDURE — 1036F TOBACCO NON-USER: CPT | Performed by: INTERNAL MEDICINE

## 2024-04-29 ASSESSMENT — ENCOUNTER SYMPTOMS
RESPIRATORY NEGATIVE: 1
CARDIOVASCULAR NEGATIVE: 1
NEUROLOGICAL NEGATIVE: 1
CONSTITUTIONAL NEGATIVE: 1

## 2024-04-29 NOTE — PROGRESS NOTES
Referred by Dr. Frausto ref. provider found provider found for   Chief Complaint   Patient presents with    Follow-up     1 year follow up.         History of Present Illness  Keith Moreno Jr. is a 69 y.o. year old male patient doing well from cardiac standpoint no complaint no symptoms of chest pain or shortness of breath.  Cholesterol is adequately controlled denies symptoms of palpitations syncope or presyncope.  Discussed with the patient in length we will continue medication will call for any problem and follow-up as scheduled    Past Medical History  Past Medical History:   Diagnosis Date    Body mass index (BMI) 31.0-31.9, adult 2022    BMI 31.0-31.9,adult    COVID-19 2022    COVID-19 virus infection    Idiopathic gout, left ankle and foot 2022    Acute idiopathic gout of left foot    Personal history of diseases of the blood and blood-forming organs and certain disorders involving the immune mechanism 2022    History of thrombocytopenia    Personal history of other diseases of the musculoskeletal system and connective tissue 2022    History of gout    Personal history of other infectious and parasitic diseases     History of herpes zoster       Social History  Social History     Tobacco Use    Smoking status: Former     Types: Cigars     Start date: 3/10/1975     Quit date: 3/10/2022     Years since quittin.1    Smokeless tobacco: Never   Vaping Use    Vaping status: Never Used   Substance Use Topics    Alcohol use: Not Currently     Alcohol/week: 5.0 standard drinks of alcohol     Types: 3 Cans of beer, 2 Shots of liquor per week    Drug use: Never       Family History     Family History   Problem Relation Name Age of Onset    Alzheimer's disease Mother      Other (diabetes mellitus) Mother      Other (malignant neoplasm of colon) Father      Other (myocardial infarction) Father      Other (malignant neoplasm of skin) Sister      Irritable bowel syndrome Daughter         Review  of Systems  As per HPI, all other systems reviewed and negative.    Allergies:  Allergies   Allergen Reactions    Bee Venom Protein (Honey Bee) Unknown and Anaphylaxis        Outpatient Medications:  Current Outpatient Medications   Medication Instructions    aspirin 81 mg EC tablet 1 tablet, oral, Daily    levothyroxine (Synthroid, Levoxyl) 50 mcg tablet 1 tablet, oral, Daily before breakfast    lisinopril 5 mg, oral, Daily    rosuvastatin (CRESTOR) 20 mg, oral, Daily         Vitals:  Vitals:    04/29/24 1502   BP: 142/80   Pulse: 78       Physical Exam:  Physical Exam  Constitutional:       Appearance: He is obese.   Neck:      Vascular: No carotid bruit.   Cardiovascular:      Rate and Rhythm: Normal rate and regular rhythm.      Pulses: Normal pulses.      Heart sounds: No murmur heard.  Pulmonary:      Effort: Pulmonary effort is normal.      Breath sounds: Normal breath sounds.   Skin:     General: Skin is warm and dry.   Neurological:      General: No focal deficit present.      Mental Status: He is alert and oriented to person, place, and time.        Review of Systems   Constitutional: Negative.    Respiratory: Negative.     Cardiovascular: Negative.    Neurological: Negative.           Assessment/Plan   Problem List Items Addressed This Visit             ICD-10-CM    Essential hypertension, benign I10    Hyperlipidemia E78.5    BMI 30.0-30.9,adult Z68.30       Scribe Attestation  By signing my name below, Roxie STYLES LPN  , Scribe   attest that this documentation has been prepared under the direction and in the presence of Eli Green MD.     Eli Green MD [facility]  Interventional Cardiology   of [facility]     Thank you for allowing me to participate in the care of this patient. Please do not hesitate to contact me with any further questions or concerns.

## 2024-07-09 ENCOUNTER — HOSPITAL ENCOUNTER (OUTPATIENT)
Dept: RADIOLOGY | Facility: HOSPITAL | Age: 69
Discharge: HOME | End: 2024-07-09
Payer: MEDICARE

## 2024-07-09 DIAGNOSIS — R91.8 OTHER NONSPECIFIC ABNORMAL FINDING OF LUNG FIELD: ICD-10-CM

## 2024-07-09 PROCEDURE — 71250 CT THORAX DX C-: CPT | Performed by: RADIOLOGY

## 2024-07-09 PROCEDURE — 71250 CT THORAX DX C-: CPT

## 2024-10-16 ENCOUNTER — APPOINTMENT (OUTPATIENT)
Dept: PRIMARY CARE | Facility: CLINIC | Age: 69
End: 2024-10-16
Payer: MEDICARE

## 2024-10-16 VITALS
HEIGHT: 67 IN | SYSTOLIC BLOOD PRESSURE: 122 MMHG | BODY MASS INDEX: 30.61 KG/M2 | HEART RATE: 64 BPM | WEIGHT: 195 LBS | DIASTOLIC BLOOD PRESSURE: 70 MMHG

## 2024-10-16 DIAGNOSIS — R73.01 ELEVATED FASTING GLUCOSE: ICD-10-CM

## 2024-10-16 DIAGNOSIS — H93.13 BILATERAL TINNITUS: ICD-10-CM

## 2024-10-16 DIAGNOSIS — E55.9 VITAMIN D DEFICIENCY: ICD-10-CM

## 2024-10-16 DIAGNOSIS — Z00.00 ROUTINE GENERAL MEDICAL EXAMINATION AT HEALTH CARE FACILITY: Primary | ICD-10-CM

## 2024-10-16 DIAGNOSIS — E11.9 DIABETES MELLITUS WITHOUT COMPLICATION (MULTI): ICD-10-CM

## 2024-10-16 DIAGNOSIS — E78.2 MIXED HYPERLIPIDEMIA: ICD-10-CM

## 2024-10-16 DIAGNOSIS — H61.23 EXCESSIVE EAR WAX, BILATERAL: ICD-10-CM

## 2024-10-16 DIAGNOSIS — I10 ESSENTIAL HYPERTENSION, BENIGN: ICD-10-CM

## 2024-10-16 DIAGNOSIS — E08.65 DIABETES MELLITUS DUE TO UNDERLYING CONDITION WITH HYPERGLYCEMIA, WITHOUT LONG-TERM CURRENT USE OF INSULIN: ICD-10-CM

## 2024-10-16 DIAGNOSIS — H93.92 UNSPECIFIED DISORDER OF LEFT EAR: ICD-10-CM

## 2024-10-16 DIAGNOSIS — R93.1 ELEVATED CORONARY ARTERY CALCIUM SCORE: ICD-10-CM

## 2024-10-16 LAB — POC HEMOGLOBIN A1C: 7.1 % (ref 4.2–6.5)

## 2024-10-16 PROCEDURE — 3008F BODY MASS INDEX DOCD: CPT | Performed by: INTERNAL MEDICINE

## 2024-10-16 PROCEDURE — 3044F HG A1C LEVEL LT 7.0%: CPT | Performed by: INTERNAL MEDICINE

## 2024-10-16 PROCEDURE — 1159F MED LIST DOCD IN RCRD: CPT | Performed by: INTERNAL MEDICINE

## 2024-10-16 PROCEDURE — 90471 IMMUNIZATION ADMIN: CPT | Performed by: INTERNAL MEDICINE

## 2024-10-16 PROCEDURE — 1170F FXNL STATUS ASSESSED: CPT | Performed by: INTERNAL MEDICINE

## 2024-10-16 PROCEDURE — 90662 IIV NO PRSV INCREASED AG IM: CPT | Performed by: INTERNAL MEDICINE

## 2024-10-16 PROCEDURE — G0008 ADMIN INFLUENZA VIRUS VAC: HCPCS | Performed by: INTERNAL MEDICINE

## 2024-10-16 PROCEDURE — 3074F SYST BP LT 130 MM HG: CPT | Performed by: INTERNAL MEDICINE

## 2024-10-16 PROCEDURE — 99497 ADVNCD CARE PLAN 30 MIN: CPT | Performed by: INTERNAL MEDICINE

## 2024-10-16 PROCEDURE — 1036F TOBACCO NON-USER: CPT | Performed by: INTERNAL MEDICINE

## 2024-10-16 PROCEDURE — 4010F ACE/ARB THERAPY RXD/TAKEN: CPT | Performed by: INTERNAL MEDICINE

## 2024-10-16 PROCEDURE — 1124F ACP DISCUSS-NO DSCNMKR DOCD: CPT | Performed by: INTERNAL MEDICINE

## 2024-10-16 PROCEDURE — 99214 OFFICE O/P EST MOD 30 MIN: CPT | Performed by: INTERNAL MEDICINE

## 2024-10-16 PROCEDURE — 90715 TDAP VACCINE 7 YRS/> IM: CPT | Performed by: INTERNAL MEDICINE

## 2024-10-16 PROCEDURE — G0439 PPPS, SUBSEQ VISIT: HCPCS | Performed by: INTERNAL MEDICINE

## 2024-10-16 PROCEDURE — 1160F RVW MEDS BY RX/DR IN RCRD: CPT | Performed by: INTERNAL MEDICINE

## 2024-10-16 PROCEDURE — 3078F DIAST BP <80 MM HG: CPT | Performed by: INTERNAL MEDICINE

## 2024-10-16 RX ORDER — METFORMIN HYDROCHLORIDE 500 MG/1
500 TABLET ORAL
Qty: 60 TABLET | Refills: 3 | Status: SHIPPED | OUTPATIENT
Start: 2024-10-16 | End: 2025-11-20

## 2024-10-16 ASSESSMENT — ACTIVITIES OF DAILY LIVING (ADL)
GROCERY_SHOPPING: INDEPENDENT
DOING_HOUSEWORK: INDEPENDENT
MANAGING_FINANCES: INDEPENDENT
BATHING: INDEPENDENT
DRESSING: INDEPENDENT
TAKING_MEDICATION: INDEPENDENT

## 2024-10-16 ASSESSMENT — LIFESTYLE VARIABLES
HOW MANY STANDARD DRINKS CONTAINING ALCOHOL DO YOU HAVE ON A TYPICAL DAY: 1 OR 2
HAVE YOU OR SOMEONE ELSE BEEN INJURED AS A RESULT OF YOUR DRINKING: NO
AUDIT TOTAL SCORE: 2
HAS A RELATIVE, FRIEND, DOCTOR, OR ANOTHER HEALTH PROFESSIONAL EXPRESSED CONCERN ABOUT YOUR DRINKING OR SUGGESTED YOU CUT DOWN: NO
AUDIT-C TOTAL SCORE: 2
HOW OFTEN DO YOU HAVE A DRINK CONTAINING ALCOHOL: 2-4 TIMES A MONTH
SKIP TO QUESTIONS 9-10: 1
HOW OFTEN DO YOU HAVE SIX OR MORE DRINKS ON ONE OCCASION: NEVER

## 2024-10-16 NOTE — ASSESSMENT & PLAN NOTE
He understands the importance of lifestyle modification and weight loss.  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

## 2024-10-16 NOTE — ASSESSMENT & PLAN NOTE
Patient understands to have the risk factor modifications.  His cardiac cath is acceptable and he will continue the current medications.  He also will follow-up with the cardiologist.

## 2024-10-16 NOTE — ASSESSMENT & PLAN NOTE
Blood pressure is well controlled and he will continue the current medications including lisinopril.  He has no side effects.

## 2024-10-16 NOTE — PROGRESS NOTES
Subjective :  Chief Complaint: Keith Moreno Jr. is an 69 y.o. male here for an annual wellness visit and general medical care and f/u.     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  Overall patient states that he is doing well.  He denies any chest pain or short of breath.    He is watching his diet as much as possible but states that there are times he may eat more carbohydrate or sugar.    He denies any urine bowel changes.      1.  Immunizations  Prevnar 20, discussed  shingles, discussed  Tdap, discussed  RSV, discussed  FLU ordered  COVID discussed    2.  Colon screening 9/2016, will call GI    3.  Eye exam 1 year ago. Will follow.    4.  Hearing test needs testing, tinnitus, test ordered    5.  Prostate 4/2024    6.  Living will discussed.    7.  Fall prevention discussed    8.  Bone density deferred    9.  Weight and gait discussed    10.  Skin care and dermatology has regular f/up 1/2025    11. DM  Not applicable    12. CAD  Done.    13. Lung screening not applicable    14. Depression screen referred    15. Blood Screen   ordered      REVIEW OF SYSTEMS:  General:  Denies significant weight changes, fever, chills or weakness.  SKIN: Denies any rash or change in moles.  HEENT:  No vision or hearing changes. No headache. No vertigo, No tinnitus.   CVS. No chest pain, no palpitation, no short of breath on mild-to-moderate exertion.  Respiratory:  No cough or shortness of breath at rest.  GI:  No loss of appetite. No change in bowel habit. No abdominal pain. No blood in stool.  GUR: No dysuria. No hematuria, No fever. No incontinence.  CNS:  No memory or mood changes. No gait disturbance. No focal weakness. No tremors. No tingling or numbness of extremities.   ENDO: No cold intolerance. No fatigue.  All other systems have been  reviewed and are negative for complaints.    All systems reviewed and negative except for what was mentioned in  "the HPI    Objective   /70   Pulse 64   Ht 1.708 m (5' 7.25\")   Wt 88.5 kg (195 lb)   BMI 30.31 kg/m²       PHYSICAL EXAM  Normal-built, well-nourished  with no apparent distress. Alert oriented  Mood is appropriate, memory is intact  Skin:  Normal turgor.  No rash.  Head:  Normocephalic, atraumatic.  Bilateral ear has wax close to the tympanic membrane but there is no complete obstruction.  Ear canals have no inflammation but somewhat hairy.  Eyes:  Pupils are equal, round,.  No pallor of conjunctivae.  Mouth has moist oral mucosa.  Neck:  Supple.  No JVD.  No carotid bruit.  No thyromegaly. No cervical lymphadenopathy.  No clubbing  Chest:  Vesicular breathing Bilaterally good air entry and bilaterally clear to auscultation.  No wheezing.  No crackles.  Heart:  Regular rate and rhythm.  S1, S2 positive.  No murmur.  Abdomen:  Soft and nontender.  Has umbilical hernia noted.  Easily reducible and has no tenderness.  Bowel sounds are positive.  No organomegaly.  Extremities:  Bilaterally no pedal pitting edema.  Bilaterally 2+ dorsalis pedis pulses.  No calf tenderness. Homans sign is negative.  Neuro Exam: No focal signs. Gait is normal.  Imaging:  No results found.     Labs reviewed:    Lab Results   Component Value Date    WBC 5.3 04/17/2024    HGB 15.0 04/17/2024    HCT 44.6 04/17/2024     (L) 04/17/2024    CHOL 137 12/22/2023    TRIG 109 12/22/2023    HDL 45.0 12/22/2023    ALT 24 04/17/2024    AST 17 04/17/2024     04/17/2024    K 4.2 04/17/2024     04/17/2024    CREATININE 0.75 04/17/2024    BUN 19 04/17/2024    CO2 26 04/17/2024    TSH 3.26 12/22/2023    PSA 0.6 11/23/2021    HGBA1C 7.1 (A) 10/16/2024       Past Medical, Surgical, and Family History reviewed and updated in chart.    I have reviewed and reconciled the medication list with the patient today.   Current Outpatient Medications:     aspirin 81 mg EC tablet, Take 1 tablet (81 mg) by mouth once daily., Disp: , Rfl:     " levothyroxine (Synthroid, Levoxyl) 50 mcg tablet, Take 1 tablet (50 mcg) by mouth once daily in the morning. Take before meals., Disp: , Rfl:     lisinopril 5 mg tablet, TAKE 1 TABLET BY MOUTH EVERY DAY, Disp: 90 tablet, Rfl: 3    rosuvastatin (Crestor) 20 mg tablet, TAKE 1 TABLET BY MOUTH EVERY DAY, Disp: 90 tablet, Rfl: 3    carbamide peroxide (Debrox) 6.5 % otic solution, Administer 5 drops into each ear once daily at bedtime., Disp: 15 mL, Rfl: 0    metFORMIN (Glucophage) 500 mg tablet, Take 1 tablet (500 mg) by mouth 2 times daily (morning and late afternoon)., Disp: 60 tablet, Rfl: 3     List of current healthcare providers:  Patient Care Team:  Avery Ozuna MD as PCP - General  [facility] Oz[facility]  Eli Green MD as Cardiologist (Cardiology)     HRA:     Over the past 2 weeks, how often have you been bothered by any of the following problems?  Little interest or pleasure in doing things: Not at all  Feeling down, depressed, or hopeless: Not at all  Patient Health Questionnaire-2 Score: 0     Audit Alcohol Screening  Q1: How often do you have a drink containing alcohol?: 2-4 times a month  Q2: How many drinks containing alcohol do you have on a typical day when you are drinking?: 1 or 2  Q3: How often do you have six or more drinks on one occasion?: Never  Audit-C Score: 2     Nutrition and Exercise  Current Diet: Well Balanced Diet  Adequate Fluid Intake: Yes  Caffeine: Yes  Exercise Frequency: Regularly  Home Safety Risk Factors: No grab bars, bathroom  Functional Ability/Level of Safety  Cognitive Impairment Observed: No cognitive impairment observed  Patient Self Assessment of Health Status  Patient Self Assessment: Excellent    Assessment/Plan :  Problem List Items Addressed This Visit       Elevated coronary artery calcium score     Patient understands to have the risk factor modifications.  His cardiac cath is acceptable and he will continue the current medications.   He also will follow-up with the cardiologist.         Elevated fasting glucose     Diagnosed with diabetes type 2.  Please review the discussion and the diabetes.           Relevant Orders    TSH with reflex to Free T4 if abnormal    Essential hypertension, benign     Blood pressure is well controlled and he will continue the current medications including lisinopril.  He has no side effects.         Relevant Orders    Comprehensive hearing test    CBC and Auto Differential    Comprehensive Metabolic Panel    Hyperlipidemia     Patient does the lifestyle modification.  He will continue the rosuvastatin.  His blood tests for the cholesterol level will be followed up.         Relevant Orders    Comprehensive Metabolic Panel    Lipid Panel    Diabetes mellitus without complication (Multi)     Patient's hemoglobin A1c is 7.1.  I had a lengthy discussion with the patient about the importance of his blood sugar control.  He understands that he has the diagnosis of diabetes type 2.  Treatment options were reviewed in detail.  Will start the patient on metformin.  Side effects benefits and the risk were reviewed and discussed.  If there is any concern he understands to call me otherwise he will be followed up in 3 months with a lab test as ordered.         Relevant Medications    metFORMIN (Glucophage) 500 mg tablet    Other Relevant Orders    Referral to Ophthalmology    POCT glycosylated hemoglobin (Hb A1C) manually resulted (Completed)    Albumin-Creatinine Ratio, Urine Random    BMI 30.0-30.9,adult     He understands the importance of lifestyle modification and weight loss.  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          Other Visit Diagnoses       Routine general medical examination at health care facility    -  Primary    Relevant Orders    1 Year Follow Up In Primary Care - Wellness Exam    Referral to  Gastroenterology    Bilateral tinnitus        Relevant Orders    Comprehensive hearing test    Vitamin D deficiency        Relevant Orders    Vitamin D 25-Hydroxy,Total (for eval of Vitamin D levels)    Unspecified disorder of left ear        Relevant Orders    Comprehensive hearing test    Diabetes mellitus due to underlying condition with hyperglycemia, without long-term current use of insulin        Relevant Orders    TSH with reflex to Free T4 if abnormal    Excessive ear wax, bilateral        Relevant Medications    carbamide peroxide (Debrox) 6.5 % otic solution          The following health maintenance schedule was reviewed with the patient and provided in printed form in the after visit summary:  Health Maintenance   Topic Date Due    Medicare Annual Wellness Visit (AWV)  Never done    Diabetes: Retinopathy Screening  Never done    Diabetes: Urine Protein Screening  Never done    RSV High Risk: (Elderly (60+) or Pregnant Population) (1 - Risk 60-74 years 1-dose series) Never done    Zoster Vaccines (2 of 3) 06/16/2016    Abdominal Aortic Aneurysm (AAA) Screening  Never done    Pneumococcal Vaccine: 65+ Years (2 of 2 - PCV) 10/14/2021    COVID-19 Vaccine (6 - 2024-25 season) 09/01/2024    TSH Level  12/22/2024    Lipid Panel  12/22/2024    Diabetes: Hemoglobin A1C  01/16/2025    Colorectal Cancer Screening  09/29/2026    DTaP/Tdap/Td Vaccines (2 - Td or Tdap) 10/16/2034    Influenza Vaccine  Completed    Hepatitis C Screening  Completed    HIB Vaccines  Aged Out    Hepatitis B Vaccines  Aged Out    IPV Vaccines  Aged Out    Hepatitis A Vaccines  Aged Out    Meningococcal Vaccine  Aged Out    Rotavirus Vaccines  Aged Out    HPV Vaccines  Aged Out    Irritable Bowel Syndrome  Discontinued       Advance Care Planning   Advance Care Planning Note     Discussion Date: 10/16/24   Discussion Participants: patient    The patient wishes to discuss Advance Care Planning today and the following is a brief summary of our  discussion.     Patient has capacity to make their own medical decisions: Yes  Health Care Agent/Surrogate Decision Maker documented in chart: No    Documents on file and valid:  Advance Directive/Living Will: No   Health Care Power of : No  Other:     Communication of Medical Status/Prognosis:   good     Communication of Treatment Goals/Options:   discussed     Treatment Decisions  Goals of Care: survival is paramount regardless of prognosis, treatment outcome, or burden     Follow Up Plan  Will do and bring paper  Team Members  Wife,  Time Statement: Total face to face time spent on advance care planning was 18 minutes with 11 minutes spent in counseling, including the explanation.    Avery Ozuna MD  10/16/2024 1:00 PM             Orders Placed This Encounter   Procedures    Tdap vaccine, age 7 years and older    Flu vaccine, trivalent, preservative free, HIGH-DOSE, age 65y+ (Fluzone)    CBC and Auto Differential     Standing Status:   Future     Standing Expiration Date:   2/16/2025     Order Specific Question:   Release result to MyChart     Answer:   Immediate    Comprehensive Metabolic Panel     Standing Status:   Future     Standing Expiration Date:   2/16/2025     Order Specific Question:   Release result to MyChart     Answer:   Immediate    Lipid Panel     fasting     Standing Status:   Future     Standing Expiration Date:   2/16/2025     Order Specific Question:   Release result to MyChart     Answer:   Immediate [1]    Vitamin D 25-Hydroxy,Total (for eval of Vitamin D levels)     Standing Status:   Future     Standing Expiration Date:   2/16/2025     Order Specific Question:   Release result to MyChart     Answer:   Immediate    TSH with reflex to Free T4 if abnormal     Standing Status:   Future     Standing Expiration Date:   2/16/2025     Order Specific Question:   Release result to MyChart     Answer:   Immediate    Albumin-Creatinine Ratio, Urine Random     Standing Status:   Future      Standing Expiration Date:   10/16/2025     Order Specific Question:   [facility] to [facility]     Answer:   Immediate [1]    Referral to Gastroenterology     Standing Status:   Future     Standing Expiration Date:   10/16/2025     Referral Priority:   Routine     Referral Type:   Consultation     Referral Reason:   Specialty Services Required     Requested Specialty:   Gastroenterology     Number of Visits Requested:   1    Referral to Ophthalmology     Standing Status:   Future     Standing Expiration Date:   10/16/2025     Referral Priority:   Routine     Referral Type:   Consultation     Referral Reason:   Specialty Services Required     Requested Specialty:   Ophthalmology     Number of Visits Requested:   1    POCT glycosylated hemoglobin (Hb A1C) manually resulted     Order Specific Question:   Release result to [facility]     Answer:   Immediate [1]    Comprehensive hearing test     Standing Status:   Future     Standing Expiration Date:   10/16/2025       Continue current medications as listed  Follow up in 6 months.

## 2024-10-16 NOTE — ASSESSMENT & PLAN NOTE
Patient does the lifestyle modification.  He will continue the rosuvastatin.  His blood tests for the cholesterol level will be followed up.

## 2024-10-16 NOTE — ASSESSMENT & PLAN NOTE
Patient's hemoglobin A1c is 7.1.  I had a lengthy discussion with the patient about the importance of his blood sugar control.  He understands that he has the diagnosis of diabetes type 2.  Treatment options were reviewed in detail.  Will start the patient on metformin.  Side effects benefits and the risk were reviewed and discussed.  If there is any concern he understands to call me otherwise he will be followed up in 3 months with a lab test as ordered.

## 2024-10-18 ENCOUNTER — LAB (OUTPATIENT)
Dept: LAB | Facility: LAB | Age: 69
End: 2024-10-18
Payer: MEDICARE

## 2024-10-18 DIAGNOSIS — E11.9 DIABETES MELLITUS WITHOUT COMPLICATION (MULTI): ICD-10-CM

## 2024-10-18 DIAGNOSIS — E78.2 MIXED HYPERLIPIDEMIA: ICD-10-CM

## 2024-10-18 DIAGNOSIS — R73.01 ELEVATED FASTING GLUCOSE: ICD-10-CM

## 2024-10-18 DIAGNOSIS — E08.65 DIABETES MELLITUS DUE TO UNDERLYING CONDITION WITH HYPERGLYCEMIA, WITHOUT LONG-TERM CURRENT USE OF INSULIN: ICD-10-CM

## 2024-10-18 DIAGNOSIS — I10 ESSENTIAL HYPERTENSION, BENIGN: ICD-10-CM

## 2024-10-18 DIAGNOSIS — E55.9 VITAMIN D DEFICIENCY: ICD-10-CM

## 2024-10-18 LAB
25(OH)D3 SERPL-MCNC: 22 NG/ML (ref 30–100)
ALBUMIN SERPL BCP-MCNC: 4.3 G/DL (ref 3.4–5)
ALP SERPL-CCNC: 82 U/L (ref 33–136)
ALT SERPL W P-5'-P-CCNC: 21 U/L (ref 10–52)
ANION GAP SERPL CALC-SCNC: 12 MMOL/L (ref 10–20)
AST SERPL W P-5'-P-CCNC: 14 U/L (ref 9–39)
BASOPHILS # BLD AUTO: 0.04 X10*3/UL (ref 0–0.1)
BASOPHILS NFR BLD AUTO: 0.8 %
BILIRUB SERPL-MCNC: 0.5 MG/DL (ref 0–1.2)
BUN SERPL-MCNC: 17 MG/DL (ref 6–23)
CALCIUM SERPL-MCNC: 9.3 MG/DL (ref 8.6–10.3)
CHLORIDE SERPL-SCNC: 104 MMOL/L (ref 98–107)
CHOLEST SERPL-MCNC: 139 MG/DL (ref 0–199)
CHOLESTEROL/HDL RATIO: 3
CO2 SERPL-SCNC: 27 MMOL/L (ref 21–32)
CREAT SERPL-MCNC: 0.83 MG/DL (ref 0.5–1.3)
CREAT UR-MCNC: 140.9 MG/DL (ref 20–370)
EGFRCR SERPLBLD CKD-EPI 2021: >90 ML/MIN/1.73M*2
EOSINOPHIL # BLD AUTO: 0.19 X10*3/UL (ref 0–0.7)
EOSINOPHIL NFR BLD AUTO: 3.7 %
ERYTHROCYTE [DISTWIDTH] IN BLOOD BY AUTOMATED COUNT: 12.2 % (ref 11.5–14.5)
GLUCOSE SERPL-MCNC: 151 MG/DL (ref 74–99)
HCT VFR BLD AUTO: 44.1 % (ref 41–52)
HDLC SERPL-MCNC: 46.3 MG/DL
HGB BLD-MCNC: 14.5 G/DL (ref 13.5–17.5)
IMM GRANULOCYTES # BLD AUTO: 0.02 X10*3/UL (ref 0–0.7)
IMM GRANULOCYTES NFR BLD AUTO: 0.4 % (ref 0–0.9)
LDLC SERPL CALC-MCNC: 74 MG/DL
LYMPHOCYTES # BLD AUTO: 0.85 X10*3/UL (ref 1.2–4.8)
LYMPHOCYTES NFR BLD AUTO: 16.6 %
MCH RBC QN AUTO: 27.9 PG (ref 26–34)
MCHC RBC AUTO-ENTMCNC: 32.9 G/DL (ref 32–36)
MCV RBC AUTO: 85 FL (ref 80–100)
MICROALBUMIN UR-MCNC: 9.9 MG/L
MICROALBUMIN/CREAT UR: 7 UG/MG CREAT
MONOCYTES # BLD AUTO: 0.74 X10*3/UL (ref 0.1–1)
MONOCYTES NFR BLD AUTO: 14.4 %
NEUTROPHILS # BLD AUTO: 3.29 X10*3/UL (ref 1.2–7.7)
NEUTROPHILS NFR BLD AUTO: 64.1 %
NON HDL CHOLESTEROL: 93 MG/DL (ref 0–149)
NRBC BLD-RTO: 0 /100 WBCS (ref 0–0)
PLATELET # BLD AUTO: 145 X10*3/UL (ref 150–450)
POTASSIUM SERPL-SCNC: 4.4 MMOL/L (ref 3.5–5.3)
PROT SERPL-MCNC: 6.8 G/DL (ref 6.4–8.2)
RBC # BLD AUTO: 5.2 X10*6/UL (ref 4.5–5.9)
SODIUM SERPL-SCNC: 139 MMOL/L (ref 136–145)
T4 FREE SERPL-MCNC: 0.77 NG/DL (ref 0.61–1.12)
TRIGL SERPL-MCNC: 92 MG/DL (ref 0–149)
TSH SERPL-ACNC: 4.25 MIU/L (ref 0.44–3.98)
VLDL: 18 MG/DL (ref 0–40)
WBC # BLD AUTO: 5.1 X10*3/UL (ref 4.4–11.3)

## 2024-10-18 PROCEDURE — 82043 UR ALBUMIN QUANTITATIVE: CPT

## 2024-10-18 PROCEDURE — 85025 COMPLETE CBC W/AUTO DIFF WBC: CPT

## 2024-10-18 PROCEDURE — 80061 LIPID PANEL: CPT

## 2024-10-18 PROCEDURE — 84443 ASSAY THYROID STIM HORMONE: CPT

## 2024-10-18 PROCEDURE — 82570 ASSAY OF URINE CREATININE: CPT

## 2024-10-18 PROCEDURE — 80053 COMPREHEN METABOLIC PANEL: CPT

## 2024-10-18 PROCEDURE — 36415 COLL VENOUS BLD VENIPUNCTURE: CPT

## 2024-10-18 PROCEDURE — 82306 VITAMIN D 25 HYDROXY: CPT

## 2024-10-18 PROCEDURE — 84439 ASSAY OF FREE THYROXINE: CPT

## 2024-11-25 DIAGNOSIS — E11.9 DIABETES MELLITUS WITHOUT COMPLICATION (MULTI): ICD-10-CM

## 2024-11-29 RX ORDER — METFORMIN HYDROCHLORIDE 500 MG/1
500 TABLET ORAL
Qty: 180 TABLET | Refills: 0 | Status: SHIPPED | OUTPATIENT
Start: 2024-11-29 | End: 2025-02-27

## 2024-12-03 DIAGNOSIS — E78.5 HYPERLIPIDEMIA, UNSPECIFIED: ICD-10-CM

## 2024-12-03 RX ORDER — ROSUVASTATIN CALCIUM 20 MG/1
20 TABLET, COATED ORAL DAILY
Qty: 90 TABLET | Refills: 3 | Status: SHIPPED | OUTPATIENT
Start: 2024-12-03

## 2025-01-22 ENCOUNTER — APPOINTMENT (OUTPATIENT)
Dept: PRIMARY CARE | Facility: CLINIC | Age: 70
End: 2025-01-22
Payer: MEDICARE

## 2025-01-22 VITALS
DIASTOLIC BLOOD PRESSURE: 78 MMHG | BODY MASS INDEX: 30.76 KG/M2 | HEIGHT: 67 IN | SYSTOLIC BLOOD PRESSURE: 131 MMHG | HEART RATE: 69 BPM | WEIGHT: 196 LBS

## 2025-01-22 DIAGNOSIS — E11.9 DIABETES MELLITUS WITHOUT COMPLICATION (MULTI): ICD-10-CM

## 2025-01-22 DIAGNOSIS — H91.93 DECREASED HEARING OF BOTH EARS: ICD-10-CM

## 2025-01-22 DIAGNOSIS — E78.2 MIXED HYPERLIPIDEMIA: Primary | ICD-10-CM

## 2025-01-22 DIAGNOSIS — D69.6 THROMBOCYTOPENIA, UNSPECIFIED (CMS-HCC): ICD-10-CM

## 2025-01-22 DIAGNOSIS — E55.9 VITAMIN D DEFICIENCY: ICD-10-CM

## 2025-01-22 DIAGNOSIS — I10 ESSENTIAL HYPERTENSION, BENIGN: ICD-10-CM

## 2025-01-22 DIAGNOSIS — J98.4 CALCIFIED GRANULOMA OF LUNG: ICD-10-CM

## 2025-01-22 DIAGNOSIS — I89.8 CALCIFIED LYMPH NODES: ICD-10-CM

## 2025-01-22 DIAGNOSIS — E03.9 HYPOTHYROIDISM, UNSPECIFIED TYPE: ICD-10-CM

## 2025-01-22 DIAGNOSIS — R93.1 ELEVATED CORONARY ARTERY CALCIUM SCORE: ICD-10-CM

## 2025-01-22 PROBLEM — J62.8: Status: RESOLVED | Noted: 2023-12-15 | Resolved: 2025-01-22

## 2025-01-22 PROBLEM — R06.83 SNORING: Status: ACTIVE | Noted: 2025-01-22

## 2025-01-22 PROBLEM — R91.8 PULMONARY NODULES/LESIONS, MULTIPLE: Status: ACTIVE | Noted: 2025-01-22

## 2025-01-22 LAB — POC HEMOGLOBIN A1C: 7.2 % (ref 4.2–6.5)

## 2025-01-22 PROCEDURE — 3078F DIAST BP <80 MM HG: CPT | Performed by: INTERNAL MEDICINE

## 2025-01-22 PROCEDURE — G2211 COMPLEX E/M VISIT ADD ON: HCPCS | Performed by: INTERNAL MEDICINE

## 2025-01-22 PROCEDURE — 1159F MED LIST DOCD IN RCRD: CPT | Performed by: INTERNAL MEDICINE

## 2025-01-22 PROCEDURE — 4010F ACE/ARB THERAPY RXD/TAKEN: CPT | Performed by: INTERNAL MEDICINE

## 2025-01-22 PROCEDURE — 3008F BODY MASS INDEX DOCD: CPT | Performed by: INTERNAL MEDICINE

## 2025-01-22 PROCEDURE — 1160F RVW MEDS BY RX/DR IN RCRD: CPT | Performed by: INTERNAL MEDICINE

## 2025-01-22 PROCEDURE — 1036F TOBACCO NON-USER: CPT | Performed by: INTERNAL MEDICINE

## 2025-01-22 PROCEDURE — 83036 HEMOGLOBIN GLYCOSYLATED A1C: CPT | Performed by: INTERNAL MEDICINE

## 2025-01-22 PROCEDURE — 99214 OFFICE O/P EST MOD 30 MIN: CPT | Performed by: INTERNAL MEDICINE

## 2025-01-22 PROCEDURE — 3075F SYST BP GE 130 - 139MM HG: CPT | Performed by: INTERNAL MEDICINE

## 2025-02-14 ENCOUNTER — APPOINTMENT (OUTPATIENT)
Dept: DERMATOLOGY | Facility: CLINIC | Age: 70
End: 2025-02-14
Payer: MEDICARE

## 2025-02-21 ENCOUNTER — APPOINTMENT (OUTPATIENT)
Dept: DERMATOLOGY | Facility: CLINIC | Age: 70
End: 2025-02-21
Payer: MEDICARE

## 2025-02-21 DIAGNOSIS — L57.0 ACTINIC KERATOSIS: ICD-10-CM

## 2025-02-21 DIAGNOSIS — L81.4 LENTIGO: ICD-10-CM

## 2025-02-21 DIAGNOSIS — D22.5 MELANOCYTIC NEVI OF TRUNK: ICD-10-CM

## 2025-02-21 DIAGNOSIS — L71.9 ROSACEA: ICD-10-CM

## 2025-02-21 DIAGNOSIS — L57.8 PHOTOAGING OF SKIN: Primary | ICD-10-CM

## 2025-02-21 DIAGNOSIS — L82.1 SEBORRHEIC KERATOSIS: ICD-10-CM

## 2025-02-21 PROCEDURE — 17003 DESTRUCT PREMALG LES 2-14: CPT | Performed by: DERMATOLOGY

## 2025-02-21 PROCEDURE — 99214 OFFICE O/P EST MOD 30 MIN: CPT | Performed by: DERMATOLOGY

## 2025-02-21 PROCEDURE — 1159F MED LIST DOCD IN RCRD: CPT | Performed by: DERMATOLOGY

## 2025-02-21 PROCEDURE — 4010F ACE/ARB THERAPY RXD/TAKEN: CPT | Performed by: DERMATOLOGY

## 2025-02-21 PROCEDURE — 17000 DESTRUCT PREMALG LESION: CPT | Performed by: DERMATOLOGY

## 2025-02-21 RX ORDER — SULFACETAMIDE SODIUM, SULFUR 100; 50 MG/G; MG/G
EMULSION TOPICAL DAILY
Qty: 170 G | Refills: 11 | Status: SHIPPED | OUTPATIENT
Start: 2025-02-21 | End: 2025-02-25 | Stop reason: SDUPTHER

## 2025-02-21 NOTE — PROGRESS NOTES
Subjective     Keith Moreno Jr. is a 69 y.o. male who presents for the following: Skin Check (No skin cancer hx. No new or changing lesions. History of Aks. Last visit 2022 - treated for actinic keratoses. Concerned primarily about face, rough spots. Also reports someone he knows uses sulfur wash and minocycline foam and would like to trial these medications for his facial redness, rash).     Review of Systems:  No other skin or systemic complaints other than what is documented elsewhere in the note.    The following portions of the chart were reviewed this encounter and updated as appropriate:         Skin Cancer History  No skin cancer on file.      Specialty Problems          Dermatology Problems    Actinic keratosis    Hemangioma of skin and subcutaneous tissue    Other seborrheic keratosis    Other skin changes due to chronic exposure to nonionizing radiation    Pigmented skin lesion        Objective   Well appearing patient in no apparent distress; mood and affect are within normal limits.    A full examination was performed including scalp, head, eyes, ears, nose, lips, neck, chest, axillae, abdomen, back, buttocks, bilateral upper extremities, bilateral lower extremities, hands, feet, fingers, toes, fingernails, and toenails. All findings within normal limits unless otherwise noted below.    Assessment/Plan   1. Photoaging of skin  Mottled pigmentation with telangiectasias and brown reticular macules in sun exposed areas of the body.    The risk of chronic, cumulative sun damage and risk of development of skin cancer was reviewed today.   The importance of sun protection was reviewed: including the use of a broad spectrum sunscreen of at least SPF 30 that protects against both UVA/UVB rays, with ingredients such as Zinc oxide or titanium dioxide, wearing sun protective clothing and sun avoidance. We reviewed the warning signs of non-melanoma skin cancer and ABCDEs of melanoma  Please follow up should you  notice any new or changing pre-existing skin lesion.    2. Rosacea  Head - Anterior (Face)  Mid face erythema with telangiectasias. Few scattered inflammatory papules on the face    The chronic and intermittently flaring nature of the skin condition was discussed with the patient today. Discussed common triggers associated with rosacea including sun exposure, spicy foods, alcohol, and stress. The various treatment options and risks and benefits reviewed.    Start sulfacetamide sodium - sulfur 10-5% cleanser, lather for 1-2 minutes once daily then rinse  Start minocycline 1.5% foam - apply to face 1-2 x daily    sulfacetamide sodium-sulfur (Avar) 10-5 % (w/w) topical emulsion - Head - Anterior (Face)  Apply topically once daily. Use to wash face and chest daily    minocycline 1.5 % foam - Head - Anterior (Face)  Apply to face daily    3. Actinic keratosis (10)  Head - Anterior (Face), Left Forearm - Posterior (2), Left Hand - Posterior (2), Right Forearm - Posterior (3), Right Hand - Posterior (2)  Erythematous macules with gritty scale.    Lesions are due to cumulative sun damage over time and are pre-cancerous. They have a risk (although small in majority of patients) of developing into squamous cell carcinoma and therefore treatment recommendations were offered and discussed.   Treatments Discussed include LN2, photodynamic (blue light) therapy & topical chemotherapy creams, risks and benefits of each.     The risks and benefits of LN2 were reviewed including incomplete removal, crusting, blister hypo and/or hyperpigmentation, scarring and recurrence. Pt elected for LN2 today    Plan for PDT treament for the face at the end of March - Daughter is getting  5/9/24    Destr of lesion - Left Forearm - Posterior (2), Left Hand - Posterior (2), Right Forearm - Posterior (3), Right Hand - Posterior (2)  Complexity: simple    Destruction method: cryotherapy    Informed consent: discussed and consent obtained     Lesion destroyed using liquid nitrogen: Yes    Region frozen until ice ball extended beyond lesion: Yes    Cryotherapy cycles:  1  Outcome: patient tolerated procedure well with no complications    Post-procedure details: wound care instructions given      Related Procedures  Photodynamic therapy    4. Melanocytic nevi of trunk  Tan-brown symmetric macules and papules    Clinically benign appearing nevi, no treatment is necessary.  The importance of sun protection was reviewed: including the use of a broad spectrum sunscreen that protects against both UVA/UVB rays, with ingredients such as Zinc oxide or titanium dioxide, wearing sun protective clothing and sun avoidance.   ABCDEs of melanoma reviewed.  Please follow up should you notice any new or changing pre-existing skin lesion.    5. Lentigo  Scattered tan macules in sun-exposed areas.    These are benign skin lesions due to sun exposure. They will darken in response to sun exposure. They should be monitored for change in size, shape or color.  These lesions can be treated cosmetically with topical creams, liquid nitrogen and a variety of lasers.    6. Seborrheic keratosis  Brown, tan waxy macules and stuck on appearing papules and plaques    The benign nature of these skin lesions reviewed, reassure provided and no further treatment needed at this time.   These lesions can be removed, if symptomatic (itching, bleeding, rubbing on clothing, painful), otherwise removal is considered cosmetic.       Patient seen and evaluated with resident, Genny Billy MD, Dermatology resident.    I was present for the entirety of the procedure(s).  I performed the procedure. I saw and evaluated the patient. I personally obtained the key and critical portions of the history and physical exam or was physically present for key and critical portions performed by the resident/fellow. I reviewed the resident/fellow's documentation and discussed the patient with the resident/fellow.  I agree with the resident/fellow's medical decision making as documented in the note.    Fiona Gordon, DO

## 2025-02-24 ENCOUNTER — DOCUMENTATION (OUTPATIENT)
Dept: DERMATOLOGY | Facility: CLINIC | Age: 70
End: 2025-02-24
Payer: MEDICARE

## 2025-02-24 NOTE — PROGRESS NOTES
PA completed and denied for Sulfacetamide sodium-sulfur 10-5% on CMM. [facility] offers same prescription for $35.    *This medication may be excluded from the patient's benefit. For more information, please reach out to [facility] directly at [phone].* copied from [facility].   Please advise

## 2025-02-25 ENCOUNTER — TELEPHONE (OUTPATIENT)
Dept: DERMATOLOGY | Facility: CLINIC | Age: 70
End: 2025-02-25
Payer: MEDICARE

## 2025-02-25 DIAGNOSIS — L71.9 ROSACEA: ICD-10-CM

## 2025-02-25 RX ORDER — SULFACETAMIDE SODIUM, SULFUR 100; 50 MG/G; MG/G
EMULSION TOPICAL DAILY
Qty: 170 G | Refills: 11 | Status: SHIPPED | OUTPATIENT
Start: 2025-02-25

## 2025-02-25 NOTE — TELEPHONE ENCOUNTER
----- Message from Fiona Gordon sent at 2/25/2025  8:44 AM EST -----  I will send to [facility] if you can let patient know, thank you  ----- Message -----  From: Nancy Knight LPN  Sent: 2/24/2025  11:34 AM EST  To: Fiona Gordon, DO    Call place to pt to inform him that PA for Sulfacleanse was denied. And RP sent to [facility]. Also informed him I will send a [id number] message with pharmacy name and phone number. Pt thankful for call and updating him.

## 2025-03-24 ENCOUNTER — CLINICAL SUPPORT (OUTPATIENT)
Dept: AUDIOLOGY | Facility: CLINIC | Age: 70
End: 2025-03-24
Payer: MEDICARE

## 2025-03-24 ENCOUNTER — APPOINTMENT (OUTPATIENT)
Dept: OTOLARYNGOLOGY | Facility: CLINIC | Age: 70
End: 2025-03-24
Payer: MEDICARE

## 2025-03-24 DIAGNOSIS — H91.93 DECREASED HEARING OF BOTH EARS: ICD-10-CM

## 2025-03-24 DIAGNOSIS — H90.3 BILATERAL SENSORINEURAL HEARING LOSS: Primary | ICD-10-CM

## 2025-03-24 DIAGNOSIS — H93.13 TINNITUS OF BOTH EARS: ICD-10-CM

## 2025-03-24 PROCEDURE — 92567 TYMPANOMETRY: CPT | Performed by: AUDIOLOGIST

## 2025-03-24 PROCEDURE — 99203 OFFICE O/P NEW LOW 30 MIN: CPT | Performed by: OTOLARYNGOLOGY

## 2025-03-24 PROCEDURE — 92557 COMPREHENSIVE HEARING TEST: CPT | Performed by: AUDIOLOGIST

## 2025-03-24 PROCEDURE — 1160F RVW MEDS BY RX/DR IN RCRD: CPT | Performed by: OTOLARYNGOLOGY

## 2025-03-24 PROCEDURE — 1159F MED LIST DOCD IN RCRD: CPT | Performed by: OTOLARYNGOLOGY

## 2025-03-24 PROCEDURE — 4010F ACE/ARB THERAPY RXD/TAKEN: CPT | Performed by: OTOLARYNGOLOGY

## 2025-03-24 NOTE — PROGRESS NOTES
Mr. Moreno, age 70, was seen today for a hearing evaluation during his ENT visit with Dr. Lundberg.  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  He reported bilateral hearing loss with difficulty understanding conversation in crowds and noisy situations as well as bilateral tinnitus for years.    Results:  Otoscopy revealed clear ear canals and tympanic membranes were visualized bilaterally.  Tympanometry revealed normal, Type A tympanograms, indicating normal ear canal volume, peak pressure and compliance bilaterally.   Audiometric thresholds revealed a slight sloping to moderately severe sensorineural hearing loss bilaterally.  Word recognition scores were excellent bilaterally.    Recommendations:  Follow-up with PCP, Dr. Ozuna, as medically directed.  Follow-up with ENT, Dr. Lundberg, as medically directed.  Recommend binaural amplification.  Retest hearing annually to monitor.

## 2025-03-24 NOTE — PROGRESS NOTES
Subjective   Patient ID: Keith Moreno Jr. is a 70 y.o. male who presents for Hearing Loss.    HPI  New patient being seen for hearing loss. Reports difficulty hearing when in noisy environments, crowds. Also with constant, bilateral tinnitus for many years. History of occupational noise exposure without use of hearing protection - worked in construction x50 years. All remaining head neck inquiry otherwise negative.     Review of Systems   Constitutional: Negative.    HENT: Negative.     Respiratory: Negative.     Cardiovascular: Negative.    Neurological: Negative.      Physical Exam  General appearance: No acute distress. Normal facies. Symmetric facial movement. No gross lesions of the face are noted.  Ears:  The external ear structures appear normal. The ear canals patent and the tympanic membranes are intact without evidence of air-fluid levels, retraction, or congenital defects.    Nose:  Anterior rhinoscopy notes essentially a midline nasal septum. Examination is noted for normal healthy mucosal membranes without any evidence of lesions, polyps, or exudate.   Throat/Oral mucosa:  The tongue is normally mobile. There are no lesions on the gingiva, buccal, or oral mucosa. There are no oral cavity masses.  Neck:  The neck is negative for mass lymphadenopathy. The trachea and parotid are clear. The thyroid bed is grossly unremarkable. The salivary gland structures are grossly unremarkable.    Audiogram:  Completed today shows slight sloping to moderately severe sensorineural hearing loss bilaterally. WRS right ear is 100%, left ear is 92%. Type A tymps bilaterally.     Assessment/Plan   Bilateral sensorineural hearing loss as seen on today's Audiogram. Patient can certainly consider hearing aids at any time. He is to follow-up in 1yr, sooner if needed.

## 2025-03-31 ENCOUNTER — APPOINTMENT (OUTPATIENT)
Dept: DERMATOLOGY | Facility: CLINIC | Age: 70
End: 2025-03-31
Payer: MEDICARE

## 2025-04-23 DIAGNOSIS — I10 ESSENTIAL (PRIMARY) HYPERTENSION: ICD-10-CM

## 2025-04-23 DIAGNOSIS — E78.5 HYPERLIPIDEMIA, UNSPECIFIED: ICD-10-CM

## 2025-04-23 RX ORDER — ROSUVASTATIN CALCIUM 20 MG/1
20 TABLET, COATED ORAL DAILY
Qty: 90 TABLET | Refills: 3 | Status: SHIPPED | OUTPATIENT
Start: 2025-04-23

## 2025-04-23 RX ORDER — LISINOPRIL 5 MG/1
5 TABLET ORAL DAILY
Qty: 90 TABLET | Refills: 3 | Status: SHIPPED | OUTPATIENT
Start: 2025-04-23

## 2025-04-29 ENCOUNTER — APPOINTMENT (OUTPATIENT)
Dept: PRIMARY CARE | Facility: CLINIC | Age: 70
End: 2025-04-29
Payer: MEDICARE

## 2025-04-30 ENCOUNTER — TELEPHONE (OUTPATIENT)
Dept: PRIMARY CARE | Facility: CLINIC | Age: 70
End: 2025-04-30

## 2025-04-30 ENCOUNTER — APPOINTMENT (OUTPATIENT)
Dept: PRIMARY CARE | Facility: CLINIC | Age: 70
End: 2025-04-30
Payer: MEDICARE

## 2025-04-30 LAB
25(OH)D3+25(OH)D2 SERPL-MCNC: 26 NG/ML (ref 30–100)
ALBUMIN SERPL-MCNC: 4.2 G/DL (ref 3.6–5.1)
ALP SERPL-CCNC: 83 U/L (ref 35–144)
ALT SERPL-CCNC: 24 U/L (ref 9–46)
ANION GAP SERPL CALCULATED.4IONS-SCNC: 8 MMOL/L (CALC) (ref 7–17)
AST SERPL-CCNC: 15 U/L (ref 10–35)
BASOPHILS # BLD AUTO: 19 CELLS/UL (ref 0–200)
BASOPHILS NFR BLD AUTO: 0.4 %
BILIRUB SERPL-MCNC: 0.7 MG/DL (ref 0.2–1.2)
BUN SERPL-MCNC: 22 MG/DL (ref 7–25)
CALCIUM SERPL-MCNC: 8.9 MG/DL (ref 8.6–10.3)
CHLORIDE SERPL-SCNC: 104 MMOL/L (ref 98–110)
CHOLEST SERPL-MCNC: 156 MG/DL
CHOLEST/HDLC SERPL: 3.3 (CALC)
CO2 SERPL-SCNC: 25 MMOL/L (ref 20–32)
CREAT SERPL-MCNC: 0.78 MG/DL (ref 0.7–1.28)
EGFRCR SERPLBLD CKD-EPI 2021: 96 ML/MIN/1.73M2
EOSINOPHIL # BLD AUTO: 139 CELLS/UL (ref 15–500)
EOSINOPHIL NFR BLD AUTO: 2.9 %
ERYTHROCYTE [DISTWIDTH] IN BLOOD BY AUTOMATED COUNT: 12.9 % (ref 11–15)
EST. AVERAGE GLUCOSE BLD GHB EST-MCNC: 160 MG/DL
EST. AVERAGE GLUCOSE BLD GHB EST-SCNC: 8.9 MMOL/L
GLUCOSE SERPL-MCNC: 163 MG/DL (ref 65–99)
HBA1C MFR BLD: 7.2 %
HCT VFR BLD AUTO: 45.8 % (ref 38.5–50)
HDLC SERPL-MCNC: 48 MG/DL
HGB BLD-MCNC: 15.3 G/DL (ref 13.2–17.1)
LDLC SERPL CALC-MCNC: 87 MG/DL (CALC)
LYMPHOCYTES # BLD AUTO: 902 CELLS/UL (ref 850–3900)
LYMPHOCYTES NFR BLD AUTO: 18.8 %
MCH RBC QN AUTO: 28.7 PG (ref 27–33)
MCHC RBC AUTO-ENTMCNC: 33.4 G/DL (ref 32–36)
MCV RBC AUTO: 85.8 FL (ref 80–100)
MONOCYTES # BLD AUTO: 682 CELLS/UL (ref 200–950)
MONOCYTES NFR BLD AUTO: 14.2 %
NEUTROPHILS # BLD AUTO: 3058 CELLS/UL (ref 1500–7800)
NEUTROPHILS NFR BLD AUTO: 63.7 %
NONHDLC SERPL-MCNC: 108 MG/DL (CALC)
PLATELET # BLD AUTO: 145 THOUSAND/UL (ref 140–400)
PMV BLD REES-ECKER: 10.7 FL (ref 7.5–12.5)
POTASSIUM SERPL-SCNC: 4.4 MMOL/L (ref 3.5–5.3)
PROT SERPL-MCNC: 6.8 G/DL (ref 6.1–8.1)
RBC # BLD AUTO: 5.34 MILLION/UL (ref 4.2–5.8)
SODIUM SERPL-SCNC: 137 MMOL/L (ref 135–146)
TRIGL SERPL-MCNC: 111 MG/DL
TSH SERPL-ACNC: 3.74 MIU/L (ref 0.4–4.5)
WBC # BLD AUTO: 4.8 THOUSAND/UL (ref 3.8–10.8)

## 2025-04-30 NOTE — RESULT ENCOUNTER NOTE
Labs acceptable but there are a few abnormalities similar to previous labs.  He also has low vitamin D.  He should take vitamin D supplement over-the-counter 2000 international unit once a day.  Please advise patient to continue other medications and follow-up to review in detail and discuss the management options as already scheduled appointment.

## 2025-04-30 NOTE — TELEPHONE ENCOUNTER
----- Message from Avery Ozuna sent at 4/30/2025  8:38 AM EDT -----  Labs acceptable but there are a few abnormalities similar to previous labs.  He also has low vitamin D.  He should take vitamin D supplement over-the-counter 2000 international unit once a day.    Please advise patient to continue other medications and follow-up to review in detail and discuss the management options as already scheduled appointment.  ----- Message -----  From: [employer] Results In  Sent: 4/30/2025   2:03 AM EDT  To: Avery Ozuna MD

## 2025-05-01 ENCOUNTER — APPOINTMENT (OUTPATIENT)
Dept: PRIMARY CARE | Facility: CLINIC | Age: 70
End: 2025-05-01
Payer: MEDICARE

## 2025-05-01 VITALS
DIASTOLIC BLOOD PRESSURE: 72 MMHG | HEART RATE: 73 BPM | SYSTOLIC BLOOD PRESSURE: 120 MMHG | WEIGHT: 197 LBS | BODY MASS INDEX: 30.92 KG/M2 | HEIGHT: 67 IN

## 2025-05-01 DIAGNOSIS — E11.9 DIABETES MELLITUS WITHOUT COMPLICATION: Primary | ICD-10-CM

## 2025-05-01 DIAGNOSIS — R93.1 ELEVATED CORONARY ARTERY CALCIUM SCORE: ICD-10-CM

## 2025-05-01 DIAGNOSIS — E78.2 MIXED HYPERLIPIDEMIA: ICD-10-CM

## 2025-05-01 DIAGNOSIS — I10 ESSENTIAL HYPERTENSION, BENIGN: ICD-10-CM

## 2025-05-01 DIAGNOSIS — R73.01 ELEVATED FASTING GLUCOSE: ICD-10-CM

## 2025-05-01 DIAGNOSIS — E55.9 VITAMIN D DEFICIENCY: ICD-10-CM

## 2025-05-01 PROBLEM — D69.6 THROMBOCYTOPENIA, UNSPECIFIED (CMS-HCC): Status: RESOLVED | Noted: 2024-01-02 | Resolved: 2025-05-01

## 2025-05-01 PROCEDURE — 3078F DIAST BP <80 MM HG: CPT | Performed by: INTERNAL MEDICINE

## 2025-05-01 PROCEDURE — 1123F ACP DISCUSS/DSCN MKR DOCD: CPT | Performed by: INTERNAL MEDICINE

## 2025-05-01 PROCEDURE — 1036F TOBACCO NON-USER: CPT | Performed by: INTERNAL MEDICINE

## 2025-05-01 PROCEDURE — 99214 OFFICE O/P EST MOD 30 MIN: CPT | Performed by: INTERNAL MEDICINE

## 2025-05-01 PROCEDURE — 3074F SYST BP LT 130 MM HG: CPT | Performed by: INTERNAL MEDICINE

## 2025-05-01 PROCEDURE — G2211 COMPLEX E/M VISIT ADD ON: HCPCS | Performed by: INTERNAL MEDICINE

## 2025-05-01 PROCEDURE — 3051F HG A1C>EQUAL 7.0%<8.0%: CPT | Performed by: INTERNAL MEDICINE

## 2025-05-01 PROCEDURE — 4010F ACE/ARB THERAPY RXD/TAKEN: CPT | Performed by: INTERNAL MEDICINE

## 2025-05-01 PROCEDURE — 1159F MED LIST DOCD IN RCRD: CPT | Performed by: INTERNAL MEDICINE

## 2025-05-01 PROCEDURE — 1160F RVW MEDS BY RX/DR IN RCRD: CPT | Performed by: INTERNAL MEDICINE

## 2025-05-01 PROCEDURE — 3008F BODY MASS INDEX DOCD: CPT | Performed by: INTERNAL MEDICINE

## 2025-05-01 PROCEDURE — 1158F ADVNC CARE PLAN TLK DOCD: CPT | Performed by: INTERNAL MEDICINE

## 2025-05-01 RX ORDER — DEXTROSE 4 G
TABLET,CHEWABLE ORAL
Qty: 1 EACH | Refills: 0 | Status: SHIPPED | OUTPATIENT
Start: 2025-05-01

## 2025-05-01 RX ORDER — ACETAMINOPHEN 500 MG
1 TABLET ORAL DAILY
COMMUNITY

## 2025-05-01 ASSESSMENT — LIFESTYLE VARIABLES
HOW OFTEN DO YOU HAVE A DRINK CONTAINING ALCOHOL: 2-3 TIMES A WEEK
AUDIT TOTAL SCORE: 3
SKIP TO QUESTIONS 9-10: 1
AUDIT-C TOTAL SCORE: 3
HOW OFTEN DO YOU HAVE SIX OR MORE DRINKS ON ONE OCCASION: NEVER
HAS A RELATIVE, FRIEND, DOCTOR, OR ANOTHER HEALTH PROFESSIONAL EXPRESSED CONCERN ABOUT YOUR DRINKING OR SUGGESTED YOU CUT DOWN: NO
HOW MANY STANDARD DRINKS CONTAINING ALCOHOL DO YOU HAVE ON A TYPICAL DAY: 1 OR 2
HAVE YOU OR SOMEONE ELSE BEEN INJURED AS A RESULT OF YOUR DRINKING: NO

## 2025-05-01 ASSESSMENT — PATIENT HEALTH QUESTIONNAIRE - PHQ9
1. LITTLE INTEREST OR PLEASURE IN DOING THINGS: NOT AT ALL
SUM OF ALL RESPONSES TO PHQ9 QUESTIONS 1 AND 2: 0
2. FEELING DOWN, DEPRESSED OR HOPELESS: NOT AT ALL

## 2025-05-01 NOTE — PROGRESS NOTES
Assessment & Plan  Type 2 diabetes mellitus  Hemoglobin A1c increased to 7.2, indicating worsened glycemic control. Fasting blood sugar elevated at 163. Discussed potential need for additional medication if control does not improve. Educated on glucose monitoring and diet. Explained insulin resistance and pancreatic decline which may gradually happens and that is why patients may need medications. Discussed diabetic complications risks including eyes and legs.  - Continue metformin twice daily.  - Monitor blood glucose levels at various times daily.  - Consult pharmacist for glucose meter options covered by insurance.  - Review Mediterranean and diabetes diet plans.  - Reassess diabetes management in six months.  - Resume vitamin D supplementation.  - Monitor and report signs of diabetic complications, especially in legs.    He has had elevated coronary artery calcium score and follows with the cardiologist regularly.  Because of his symptoms he had complete cardiac workup including cardiac cath which showed no significant obstructive lesions.  He will be following up with the cardiologist as scheduled.    His cholesterol is well-controlled and he will continue the rosuvastatin.    His blood pressure is controlled and he will continue the lisinopril.    He is euthyroid and he will continue the thyroid replacement.    He has muscular build but discussed the importance of weight control and at least maintaining a healthy weight.    Low vitamin D and discussed the importance of taking the regular vitamin D supplement.    Wellness Visit  Planned for comprehensive preventive care including immunizations and screenings.  - Schedule wellness visit in four months.  - Perform complete exam with immunizations and screenings during visit.  - Conduct blood tests prior to wellness visit.       Assessment/Plan     Problem List Items Addressed This Visit       Elevated coronary artery calcium score    Elevated fasting glucose     Essential hypertension, benign    Relevant Orders    Comprehensive Metabolic Panel    Hyperlipidemia    Relevant Orders    CBC and Auto Differential    Lipid Panel    Diabetes mellitus without complication - Primary    Relevant Medications    blood sugar diagnostic    blood-glucose meter misc    Other Relevant Orders    Comprehensive Metabolic Panel    Hemoglobin A1C    BMI 30.0-30.9,adult    Vitamin D deficiency    Relevant Orders    Vitamin D 25-Hydroxy,Total (for eval of Vitamin D levels)       Subjective 0    Patient ID: Keith Moreno Jr. is a 70 y.o. male who presents for Follow-up and Results.    History of Present Illness  Keith Moreno Jr. is a 70 year old male with diabetes who presents for follow-up of his blood sugar levels.    His hemoglobin A1c has increased from 6.9 to 7.2, indicating a slight rise in blood sugar levels. Fasting blood sugar was recorded at 163 mg/dL, which is higher than desired. He is currently taking metformin twice daily and is doing well with this medication.    He is conscious of his diet, aiming to reduce sugar and carbohydrate intake while maintaining protein and green vegetable consumption. He prefers vegetables like carrots, celery, beets, and radishes. He maintains an active lifestyle, frequently engaging in physical activities such as climbing ladders, which contributes to his overall good physical condition.    No issues with urination or bowel movements, stating 'my poop looks good.' No swelling, pain, or numbness in his legs. He is aware of the importance of monitoring for diabetic-related complications such as leg ulcers.    He has not been taking vitamin D recently but plans to resume supplementation. He acknowledges that he stopped taking it without a specific reason.    He had an eye exam six months ago and an ear exam with Dr. Murphy, who suggested he might benefit from hearing aids.       Surgical History[1]     ROS rest of the review of systems no acute  complaint.    Family History[2]   Social History     Socioeconomic History    Marital status:      Spouse name: Not on file    Number of children: Not on file    Years of education: Not on file    Highest education level: Not on file   Occupational History    Not on file   Tobacco Use    Smoking status: Former     Types: Cigars     Start date: 3/10/1975     Quit date: 3/10/2022     Years since quitting: 3.1    Smokeless tobacco: Never   Vaping Use    Vaping status: Never Used   Substance and Sexual Activity    Alcohol use: Yes     Alcohol/week: 5.0 standard drinks of alcohol     Types: 3 Cans of beer, 2 Shots of liquor per week    Drug use: Never    Sexual activity: Not on file   Other Topics Concern    Not on file   Social History Narrative    Not on file     Social Drivers of Health     Financial Resource Strain: Not on file   Food Insecurity: Not on file   Transportation Needs: Not on file   Physical Activity: Not on file   Stress: Not on file   Social Connections: Not on file   Intimate Partner Violence: Not on file   Housing Stability: Not on file      Bee venom protein (honey bee)   Current Rx[3]     Objective     Vitals:    05/01/25 0953   BP: 120/72   Pulse: 73        Physical Exam    Problem List Items Addressed This Visit       Elevated coronary artery calcium score    Elevated fasting glucose    Essential hypertension, benign    Relevant Orders    Comprehensive Metabolic Panel    Hyperlipidemia    Relevant Orders    CBC and Auto Differential    Lipid Panel    Diabetes mellitus without complication - Primary    Relevant Medications    blood sugar diagnostic    blood-glucose meter misc    Other Relevant Orders    Comprehensive Metabolic Panel    Hemoglobin A1C    BMI 30.0-30.9,adult    Vitamin D deficiency    Relevant Orders    Vitamin D 25-Hydroxy,Total (for eval of Vitamin D levels)        Orders Placed This Encounter   Procedures    CBC and Auto Differential     Standing Status:   Future      Expected Date:   8/1/2025     Expiration Date:   5/1/2026     Release result to MyChart:   Immediate    Comprehensive Metabolic Panel     Standing Status:   Future     Expected Date:   8/1/2025     Expiration Date:   5/1/2026     [facility]   Immediate    Lipid Panel     fasting     Standing Status:   Future     Expected Date:   8/1/2025     Expiration Date:   5/1/2026     Release result to MyChart:   Immediate [1]    Vitamin D 25-Hydroxy,Total (for eval of Vitamin D levels)     Standing Status:   Future     Expected Date:   8/1/2025     Expiration Date:   5/1/2026     Release result to MyChart:   Immediate    Hemoglobin A1C     Standing Status:   Future     Expected Date:   8/1/2025     Expiration Date:   5/1/2026     [facility]   Immediate        @LASTLAB[<insert lab base name>:<insert number of results or*for all>,<repeat format for multiple labs>]@  Lab Results   Component Value Date    CHOL 156 04/29/2025    LDLCALC 87 04/29/2025    CHHDL 3.3 04/29/2025       Imaging  No results found.    Cardiology, Vascular, and Other Imaging  No other imaging results found for the past 7 days            This medical note was created with the assistance of artificial intelligence (AI) for documentation purposes. The content has been reviewed and confirmed by the healthcare provider for accuracy and completeness. Patient consented to the use of audio recording and use of AI during their visit.        [1]   Past Surgical History:  Procedure Laterality Date    OTHER SURGICAL HISTORY  11/29/2021    Wrist surgery    OTHER SURGICAL HISTORY  07/28/2022    Complete colonoscopy   [2]   Family History  Problem Relation Name Age of Onset    Alzheimer's disease Mother      Other (diabetes mellitus) Mother      Other (malignant neoplasm of colon) Father      Other (myocardial infarction) Father      Other (malignant neoplasm of skin) Sister      Irritable bowel syndrome Daughter     [3]   Current Outpatient  Medications   Medication Sig Dispense Refill    aspirin 81 mg EC tablet Take 1 tablet (81 mg) by mouth once daily.      cholecalciferol (Vitamin D3) 50 mcg (2,000 units) capsule Take 1 capsule (50 mcg) by mouth once daily.      levothyroxine (Synthroid, Levoxyl) 50 mcg tablet Take 1 tablet (50 mcg) by mouth once daily in the morning. Take before meals.      lisinopril 5 mg tablet Take 1 tablet (5 mg) by mouth once daily. 90 tablet 3    metFORMIN (Glucophage) 500 mg tablet TAKE 1 TABLET (500 MG) BY MOUTH 2 TIMES DAILY (MORNING AND LATE AFTERNOON). 180 tablet 0    minocycline 1.5 % foam Apply to face daily 30 g 11    rosuvastatin (Crestor) 20 mg tablet Take 1 tablet (20 mg) by mouth once daily. 90 tablet 3    sulfacetamide sodium-sulfur (Avar) 10-5 % (w/w) topical emulsion Apply topically once daily. Use to wash face and chest daily 170 g 11    blood sugar diagnostic 1 strip once daily as needed (before meals alternate time). 100 strip 1    blood-glucose meter misc To use blood sugar testing 1 each 0     No current facility-administered medications for this visit.

## 2025-05-05 ENCOUNTER — APPOINTMENT (OUTPATIENT)
Dept: CARDIOLOGY | Facility: CLINIC | Age: 70
End: 2025-05-05
Payer: MEDICARE

## 2025-05-06 ENCOUNTER — TELEPHONE (OUTPATIENT)
Dept: CARDIOLOGY | Facility: CLINIC | Age: 70
End: 2025-05-06
Payer: MEDICARE

## 2025-05-06 DIAGNOSIS — E11.9 DIABETES MELLITUS WITHOUT COMPLICATION: Primary | ICD-10-CM

## 2025-05-06 RX ORDER — LANCETS
EACH MISCELLANEOUS
Qty: 100 EACH | Refills: 3 | Status: SHIPPED | OUTPATIENT
Start: 2025-05-06

## 2025-05-06 RX ORDER — ISOPROPYL ALCOHOL 70 ML/100ML
1 SWAB TOPICAL DAILY PRN
Qty: 100 EACH | Refills: 1 | Status: SHIPPED | OUTPATIENT
Start: 2025-05-06

## 2025-05-06 NOTE — TELEPHONE ENCOUNTER
Jared bal stating received Rx for glucose test strips and monitor, but did not receive order for lancets or alcohol swabs. Asking if patient should have both, if so, will need Rx sent for both

## 2025-05-18 DIAGNOSIS — E11.9 DIABETES MELLITUS WITHOUT COMPLICATION: ICD-10-CM

## 2025-05-19 ENCOUNTER — APPOINTMENT (OUTPATIENT)
Dept: CARDIOLOGY | Facility: CLINIC | Age: 70
End: 2025-05-19
Payer: MEDICARE

## 2025-05-19 VITALS
SYSTOLIC BLOOD PRESSURE: 146 MMHG | HEART RATE: 78 BPM | BODY MASS INDEX: 31.01 KG/M2 | DIASTOLIC BLOOD PRESSURE: 66 MMHG | WEIGHT: 197.6 LBS | HEIGHT: 67 IN

## 2025-05-19 DIAGNOSIS — I10 ESSENTIAL HYPERTENSION, BENIGN: ICD-10-CM

## 2025-05-19 DIAGNOSIS — R93.1 ELEVATED CORONARY ARTERY CALCIUM SCORE: ICD-10-CM

## 2025-05-19 DIAGNOSIS — E78.2 MIXED HYPERLIPIDEMIA: ICD-10-CM

## 2025-05-19 PROCEDURE — 3051F HG A1C>EQUAL 7.0%<8.0%: CPT | Performed by: INTERNAL MEDICINE

## 2025-05-19 PROCEDURE — 1159F MED LIST DOCD IN RCRD: CPT | Performed by: INTERNAL MEDICINE

## 2025-05-19 PROCEDURE — 3078F DIAST BP <80 MM HG: CPT | Performed by: INTERNAL MEDICINE

## 2025-05-19 PROCEDURE — 3008F BODY MASS INDEX DOCD: CPT | Performed by: INTERNAL MEDICINE

## 2025-05-19 PROCEDURE — 3077F SYST BP >= 140 MM HG: CPT | Performed by: INTERNAL MEDICINE

## 2025-05-19 PROCEDURE — 99214 OFFICE O/P EST MOD 30 MIN: CPT | Performed by: INTERNAL MEDICINE

## 2025-05-19 PROCEDURE — 4010F ACE/ARB THERAPY RXD/TAKEN: CPT | Performed by: INTERNAL MEDICINE

## 2025-05-19 ASSESSMENT — ENCOUNTER SYMPTOMS
CONSTITUTIONAL NEGATIVE: 1
CARDIOVASCULAR NEGATIVE: 1
DIZZINESS: 0
RESPIRATORY NEGATIVE: 1
SHORTNESS OF BREATH: 0
NEUROLOGICAL NEGATIVE: 1

## 2025-05-19 NOTE — PROGRESS NOTES
Referred by Dr. Frausto ref. provider found provider found for   Chief Complaint   Patient presents with    Follow-up     htn        History of Present Illness  Keith Moreno Jr. is a 70 y.o. year old male patient doing well from a cardiac standpoint no complaint no symptoms of chest pain or shortness of breath.  Denies any symptoms of palpitations syncope presyncope.  Discussed with the patient we will continue medication blood pressure was mildly elevated today but he stated that his blood pressure has been controlled at home he does check it frequently.  I told him to check the blood pressure report back to me if is elevated.  Will call for any problem and follow-up as scheduled    Past Medical History  Medical History[1]    Social History  Social History[2]    Family History   Family History[3]    Review of Systems  As per HPI, all other systems reviewed and negative.    Allergies:  RX Allergies[4]     Outpatient Medications:  Current Outpatient Medications   Medication Instructions    alcohol swabs 1 Swab, topical (top), Daily PRN    aspirin 81 mg EC tablet 1 tablet, Daily    blood sugar diagnostic 1 strip, miscellaneous, Daily PRN    blood-glucose meter misc To use blood sugar testing    cholecalciferol (Vitamin D3) 50 mcg (2,000 units) capsule 1 capsule, Daily    lancets misc To use it for blood sugar monitor.    levothyroxine (Synthroid, Levoxyl) 50 mcg tablet 1 tablet, Daily before breakfast    lisinopril 5 mg, oral, Daily    metFORMIN (GLUCOPHAGE) 500 mg, oral, 2 times daily (morning and late afternoon)    minocycline 1.5 % foam Apply to face daily    rosuvastatin (CRESTOR) 20 mg, oral, Daily    sulfacetamide sodium-sulfur (Avar) 10-5 % (w/w) topical emulsion Topical, Daily, Use to wash face and chest daily         Vitals:  Vitals:    05/19/25 1559   BP: 146/66   Pulse: 78       Physical Exam:  Physical Exam  Constitutional:       Appearance: He is obese.   Neck:      Vascular: No carotid bruit.    Cardiovascular:      Rate and Rhythm: Normal rate and regular rhythm.      Pulses: Normal pulses.      Heart sounds: No murmur heard.  Pulmonary:      Effort: Pulmonary effort is normal.      Breath sounds: Normal breath sounds.   Musculoskeletal:         General: Normal range of motion.      Cervical back: Normal range of motion.   Skin:     General: Skin is warm and dry.   Neurological:      General: No focal deficit present.      Mental Status: He is alert and oriented to person, place, and time. Mental status is at baseline.         Review of Systems   Constitutional: Negative.    Respiratory: Negative.  Negative for shortness of breath.    Cardiovascular: Negative.  Negative for chest pain.   Neurological: Negative.  Negative for dizziness.         Assessment/Plan   Problem List Items Addressed This Visit           ICD-10-CM    Elevated coronary artery calcium score R93.1    Essential hypertension, benign I10    Hyperlipidemia E78.5    BMI 30.0-30.9,adult Z68.30       Scribe Attestation  By signing my name below, Roxie STYLES LPN  , Scribe   attest that this documentation has been prepared under the direction and in the presence of Eli Green MD.      Eli Green MD [facility]  Interventional Cardiology   of [facility]     Thank you for allowing me to participate in the care of this patient. Please do not hesitate to contact me with any further questions or concerns.         [1]   Past Medical History:  Diagnosis Date    Abnormal weight loss 08/24/2016    Body mass index (BMI) 31.0-31.9, adult 02/07/2022    BMI 31.0-31.9,adult    COVID-19 01/26/2022    COVID-19 virus infection    Idiopathic gout, left ankle and foot 07/28/2022    Acute idiopathic gout of left foot    Personal history of diseases of the blood and blood-forming organs and certain disorders involving the immune mechanism 03/04/2022    History of thrombocytopenia    Personal history of other diseases of the  musculoskeletal system and connective tissue 09/08/2022    History of gout    Personal history of other infectious and parasitic diseases     History of herpes zoster    Thrombocytopenia, unspecified (CMS-HCC) 01/02/2024   [2]   Social History  Tobacco Use    Smoking status: Some Days     Types: Cigars     Start date: 3/10/1975     Last attempt to quit: 3/10/2022     Years since quitting: 3.1    Smokeless tobacco: Never   Vaping Use    Vaping status: Never Used   Substance Use Topics    Alcohol use: Yes     Alcohol/week: 5.0 standard drinks of alcohol     Types: 3 Cans of beer, 2 Shots of liquor per week    Drug use: Never   [3]   Family History  Problem Relation Name Age of Onset    Alzheimer's disease Mother      Other (diabetes mellitus) Mother      Other (malignant neoplasm of colon) Father      Other (myocardial infarction) Father      Other (malignant neoplasm of skin) Sister      Irritable bowel syndrome Daughter     [4]   Allergies  Allergen Reactions    Bee Venom Protein (Honey Bee) Unknown and Anaphylaxis

## 2025-05-20 RX ORDER — METFORMIN HYDROCHLORIDE 500 MG/1
500 TABLET ORAL
Qty: 180 TABLET | Refills: 0 | Status: SHIPPED | OUTPATIENT
Start: 2025-05-20 | End: 2025-08-18

## 2025-08-01 DIAGNOSIS — E78.2 MIXED HYPERLIPIDEMIA: ICD-10-CM

## 2025-08-01 DIAGNOSIS — I10 ESSENTIAL HYPERTENSION, BENIGN: ICD-10-CM

## 2025-08-01 DIAGNOSIS — E55.9 VITAMIN D DEFICIENCY: ICD-10-CM

## 2025-08-01 DIAGNOSIS — E11.9 DIABETES MELLITUS WITHOUT COMPLICATION: ICD-10-CM

## 2025-09-02 ENCOUNTER — APPOINTMENT (OUTPATIENT)
Dept: PRIMARY CARE | Facility: CLINIC | Age: 70
End: 2025-09-02
Payer: MEDICARE

## 2025-10-20 ENCOUNTER — APPOINTMENT (OUTPATIENT)
Dept: PRIMARY CARE | Facility: CLINIC | Age: 70
End: 2025-10-20
Payer: MEDICARE

## 2026-03-02 ENCOUNTER — APPOINTMENT (OUTPATIENT)
Dept: CARDIOLOGY | Facility: CLINIC | Age: 71
End: 2026-03-02
Payer: MEDICARE